# Patient Record
Sex: MALE | Race: WHITE
[De-identification: names, ages, dates, MRNs, and addresses within clinical notes are randomized per-mention and may not be internally consistent; named-entity substitution may affect disease eponyms.]

---

## 2018-02-05 ENCOUNTER — HOSPITAL ENCOUNTER (OUTPATIENT)
Dept: HOSPITAL 89 - LAB | Age: 78
End: 2018-02-05
Attending: FAMILY MEDICINE
Payer: MEDICARE

## 2018-02-05 DIAGNOSIS — E55.9: ICD-10-CM

## 2018-02-05 DIAGNOSIS — I10: Primary | ICD-10-CM

## 2018-02-05 DIAGNOSIS — E53.8: ICD-10-CM

## 2018-02-05 LAB — PLATELET COUNT, AUTOMATED: 194 K/UL (ref 150–450)

## 2018-02-05 PROCEDURE — 82435 ASSAY OF BLOOD CHLORIDE: CPT

## 2018-02-05 PROCEDURE — 82607 VITAMIN B-12: CPT

## 2018-02-05 PROCEDURE — 84075 ASSAY ALKALINE PHOSPHATASE: CPT

## 2018-02-05 PROCEDURE — 82040 ASSAY OF SERUM ALBUMIN: CPT

## 2018-02-05 PROCEDURE — 82947 ASSAY GLUCOSE BLOOD QUANT: CPT

## 2018-02-05 PROCEDURE — 85025 COMPLETE CBC W/AUTO DIFF WBC: CPT

## 2018-02-05 PROCEDURE — 82565 ASSAY OF CREATININE: CPT

## 2018-02-05 PROCEDURE — 36415 COLL VENOUS BLD VENIPUNCTURE: CPT

## 2018-02-05 PROCEDURE — 84132 ASSAY OF SERUM POTASSIUM: CPT

## 2018-02-05 PROCEDURE — 82374 ASSAY BLOOD CARBON DIOXIDE: CPT

## 2018-02-05 PROCEDURE — 84295 ASSAY OF SERUM SODIUM: CPT

## 2018-02-05 PROCEDURE — 82306 VITAMIN D 25 HYDROXY: CPT

## 2018-02-05 PROCEDURE — 82247 BILIRUBIN TOTAL: CPT

## 2018-02-05 PROCEDURE — 84450 TRANSFERASE (AST) (SGOT): CPT

## 2018-02-05 PROCEDURE — 82310 ASSAY OF CALCIUM: CPT

## 2018-02-05 PROCEDURE — 84460 ALANINE AMINO (ALT) (SGPT): CPT

## 2018-02-05 PROCEDURE — 84155 ASSAY OF PROTEIN SERUM: CPT

## 2018-02-05 PROCEDURE — 84520 ASSAY OF UREA NITROGEN: CPT

## 2018-08-23 LAB — PLATELET COUNT, AUTOMATED: 249 K/UL (ref 150–450)

## 2018-08-23 NOTE — EKG
FACILITY: [facility] 

 

PATIENT NAME: SHAYNE ANDUJAR

: [id number]

MR: [id number]

V: [id number]

EXAM DATE: 

ORDERING PHYSICIAN: JENNIFER ANDUJAR

TECHNOLOGIST: 

 

Test Reason : 

Blood Pressure : ***/*** mmHG

Vent. Rate : 074 BPM     Atrial Rate : 258 BPM

   P-R Int : 000 ms          QRS Dur : 094 ms

    QT Int : 420 ms       P-R-T Axes : 000 -47 022 degrees

   QTc Int : 466 ms

 

Atrial fibrillation

Left anterior fascicular block

Abnormal ECG

When compared with ECG of 07-OCT-2017 22:15,

No significant change was found

Confirmed by Simon Pérez ([id number]) on 2018 3:43:51 PM

 

Referred By:             Confirmed By:Simon Frank

## 2018-08-29 ENCOUNTER — HOSPITAL ENCOUNTER (INPATIENT)
Dept: HOSPITAL 89 - OR | Age: 78
LOS: 5 days | Discharge: NURSING, REHAB OR LONG-TERM CARE | DRG: 460 | End: 2018-09-03
Attending: ORTHOPAEDIC SURGERY | Admitting: ORTHOPAEDIC SURGERY
Payer: MEDICARE

## 2018-08-29 VITALS
WEIGHT: 224 LBS | BODY MASS INDEX: 35.16 KG/M2 | BODY MASS INDEX: 35.16 KG/M2 | WEIGHT: 224 LBS | HEIGHT: 67 IN | HEIGHT: 67 IN

## 2018-08-29 VITALS — DIASTOLIC BLOOD PRESSURE: 84 MMHG | SYSTOLIC BLOOD PRESSURE: 116 MMHG

## 2018-08-29 VITALS — SYSTOLIC BLOOD PRESSURE: 94 MMHG | DIASTOLIC BLOOD PRESSURE: 84 MMHG

## 2018-08-29 VITALS — DIASTOLIC BLOOD PRESSURE: 66 MMHG | SYSTOLIC BLOOD PRESSURE: 97 MMHG

## 2018-08-29 VITALS — SYSTOLIC BLOOD PRESSURE: 122 MMHG | DIASTOLIC BLOOD PRESSURE: 90 MMHG

## 2018-08-29 VITALS — SYSTOLIC BLOOD PRESSURE: 108 MMHG | DIASTOLIC BLOOD PRESSURE: 87 MMHG

## 2018-08-29 VITALS — DIASTOLIC BLOOD PRESSURE: 86 MMHG | SYSTOLIC BLOOD PRESSURE: 122 MMHG

## 2018-08-29 VITALS — DIASTOLIC BLOOD PRESSURE: 77 MMHG | SYSTOLIC BLOOD PRESSURE: 115 MMHG

## 2018-08-29 VITALS — SYSTOLIC BLOOD PRESSURE: 126 MMHG | DIASTOLIC BLOOD PRESSURE: 95 MMHG

## 2018-08-29 VITALS — DIASTOLIC BLOOD PRESSURE: 77 MMHG | SYSTOLIC BLOOD PRESSURE: 104 MMHG

## 2018-08-29 VITALS — SYSTOLIC BLOOD PRESSURE: 116 MMHG | DIASTOLIC BLOOD PRESSURE: 74 MMHG

## 2018-08-29 VITALS — DIASTOLIC BLOOD PRESSURE: 70 MMHG | SYSTOLIC BLOOD PRESSURE: 111 MMHG

## 2018-08-29 VITALS — SYSTOLIC BLOOD PRESSURE: 118 MMHG | DIASTOLIC BLOOD PRESSURE: 80 MMHG

## 2018-08-29 VITALS — DIASTOLIC BLOOD PRESSURE: 86 MMHG | SYSTOLIC BLOOD PRESSURE: 124 MMHG

## 2018-08-29 VITALS — DIASTOLIC BLOOD PRESSURE: 89 MMHG | SYSTOLIC BLOOD PRESSURE: 135 MMHG

## 2018-08-29 DIAGNOSIS — M43.16: ICD-10-CM

## 2018-08-29 DIAGNOSIS — Z90.49: ICD-10-CM

## 2018-08-29 DIAGNOSIS — Z99.81: ICD-10-CM

## 2018-08-29 DIAGNOSIS — Z98.84: ICD-10-CM

## 2018-08-29 DIAGNOSIS — Z79.01: ICD-10-CM

## 2018-08-29 DIAGNOSIS — I10: ICD-10-CM

## 2018-08-29 DIAGNOSIS — M10.9: ICD-10-CM

## 2018-08-29 DIAGNOSIS — M54.16: ICD-10-CM

## 2018-08-29 DIAGNOSIS — G47.33: ICD-10-CM

## 2018-08-29 DIAGNOSIS — K21.9: ICD-10-CM

## 2018-08-29 DIAGNOSIS — J45.909: ICD-10-CM

## 2018-08-29 DIAGNOSIS — M48.061: Primary | ICD-10-CM

## 2018-08-29 DIAGNOSIS — I48.2: ICD-10-CM

## 2018-08-29 DIAGNOSIS — G89.18: ICD-10-CM

## 2018-08-29 PROCEDURE — 0SG1071 FUSION OF 2 OR MORE LUMBAR VERTEBRAL JOINTS WITH AUTOLOGOUS TISSUE SUBSTITUTE, POSTERIOR APPROACH, POSTERIOR COLUMN, OPEN APPROACH: ICD-10-PCS | Performed by: ORTHOPAEDIC SURGERY

## 2018-08-29 PROCEDURE — 82310 ASSAY OF CALCIUM: CPT

## 2018-08-29 PROCEDURE — 72020 X-RAY EXAM OF SPINE 1 VIEW: CPT

## 2018-08-29 PROCEDURE — 82374 ASSAY BLOOD CARBON DIOXIDE: CPT

## 2018-08-29 PROCEDURE — 82565 ASSAY OF CREATININE: CPT

## 2018-08-29 PROCEDURE — 82040 ASSAY OF SERUM ALBUMIN: CPT

## 2018-08-29 PROCEDURE — 82947 ASSAY GLUCOSE BLOOD QUANT: CPT

## 2018-08-29 PROCEDURE — 36415 COLL VENOUS BLD VENIPUNCTURE: CPT

## 2018-08-29 PROCEDURE — 82247 BILIRUBIN TOTAL: CPT

## 2018-08-29 PROCEDURE — 94640 AIRWAY INHALATION TREATMENT: CPT

## 2018-08-29 PROCEDURE — 01NB0ZZ RELEASE LUMBAR NERVE, OPEN APPROACH: ICD-10-PCS | Performed by: ORTHOPAEDIC SURGERY

## 2018-08-29 PROCEDURE — 84132 ASSAY OF SERUM POTASSIUM: CPT

## 2018-08-29 PROCEDURE — 86900 BLOOD TYPING SEROLOGIC ABO: CPT

## 2018-08-29 PROCEDURE — 86850 RBC ANTIBODY SCREEN: CPT

## 2018-08-29 PROCEDURE — 84075 ASSAY ALKALINE PHOSPHATASE: CPT

## 2018-08-29 PROCEDURE — 86901 BLOOD TYPING SEROLOGIC RH(D): CPT

## 2018-08-29 PROCEDURE — 72100 X-RAY EXAM L-S SPINE 2/3 VWS: CPT

## 2018-08-29 PROCEDURE — 85018 HEMOGLOBIN: CPT

## 2018-08-29 PROCEDURE — 84520 ASSAY OF UREA NITROGEN: CPT

## 2018-08-29 PROCEDURE — 5A09357 ASSISTANCE WITH RESPIRATORY VENTILATION, LESS THAN 24 CONSECUTIVE HOURS, CONTINUOUS POSITIVE AIRWAY PRESSURE: ICD-10-PCS | Performed by: INTERNAL MEDICINE

## 2018-08-29 PROCEDURE — 84155 ASSAY OF PROTEIN SERUM: CPT

## 2018-08-29 PROCEDURE — 84460 ALANINE AMINO (ALT) (SGPT): CPT

## 2018-08-29 PROCEDURE — 84295 ASSAY OF SERUM SODIUM: CPT

## 2018-08-29 PROCEDURE — 93005 ELECTROCARDIOGRAM TRACING: CPT

## 2018-08-29 PROCEDURE — 82435 ASSAY OF BLOOD CHLORIDE: CPT

## 2018-08-29 PROCEDURE — 84450 TRANSFERASE (AST) (SGOT): CPT

## 2018-08-29 PROCEDURE — 97161 PT EVAL LOW COMPLEX 20 MIN: CPT

## 2018-08-29 PROCEDURE — 84443 ASSAY THYROID STIM HORMONE: CPT

## 2018-08-29 PROCEDURE — 85025 COMPLETE CBC W/AUTO DIFF WBC: CPT

## 2018-08-29 RX ADMIN — MONTELUKAST SODIUM SCH MG: 10 TABLET, FILM COATED ORAL at 21:04

## 2018-08-29 RX ADMIN — SODIUM CHLORIDE SCH MLS/HR: 900 IRRIGANT IRRIGATION at 22:23

## 2018-08-29 RX ADMIN — OXYCODONE HYDROCHLORIDE PRN MG: 5 TABLET ORAL at 22:23

## 2018-08-29 RX ADMIN — DOCUSATE SODIUM SCH MG: 100 CAPSULE, LIQUID FILLED ORAL at 21:04

## 2018-08-29 RX ADMIN — OXYCODONE HYDROCHLORIDE PRN MG: 5 TABLET ORAL at 13:40

## 2018-08-29 NOTE — OPERATIVE REPORT 1
EVENT DATE: August 29, 2018

SURGEON: Alek Farr MD

ANESTHESIOLOGIST: Aris Heard MD 

ANESTHESIA: General endotracheal.

ASSISTANT: JAY Odom





PREOPERATIVE DIAGNOSIS  

Lumbar spinal stenosis with lumbar third vertebra-lumbar fourth vertebra (L3-L4)
and lumbar fourth vertebra-lumbar fifth vertebra (L4-L5) spondylolisthesis.



POSTOPERATIVE DIAGNOSIS 

Lumbar spinal stenosis with lumbar third vertebra-lumbar fourth vertebra (L3-L4)
and lumbar fourth vertebra-lumbar fifth vertebra (L4-L5) spondylolisthesis.



PROCEDURE PERFORMED 

Lumbar third vertebra (L3)- lumbar fifth vertebra (L5) laminectomy with lumbar 
third vertebra (L3)-lumbar fifth vertebra (L5) posterior lateral instrumented 
fusion.



IV FLUIDS

2200 cc.



ESTIMATED BLOOD LOSS 

160 cc.



IMPLANTS USED 

6.5 mm x 50 mm Reline pedicle screws from [employer] x6, 5.5 mm x 60 mm connecting
rods from [employer] x2 and locking caps from [employer] 46.



SPECIMENS

None.



DRAINS

None.



COMPLICATIONS

None,



DISPOSITION

Post-anesthesia Care Unit.



INDICATIONS FOR SURGERY

Mr. Farr is a 78-year-old gentleman who presented to my clinic with the chief 
complaint of radiating bilateral lower extremity pain, numbness and tingling.  
He noted significant decrease in walking tolerance secondary to heaviness and 
tiredness of the legs, worse on the right than the left.  He did have some urge 
incontinence of urine but no issues with fecal incontinence.  His physical 
examination was significant for a positive straight leg raising maneuver 
bilaterally with decreased strength in the right tibialis anterior at 4/5 
compared to 5/5 on the left.  His extensor hallucis longus was 4+/5 on the left,
4/5 on the right and peroneals were 4/5 bilaterally.  Light touch sensation was 
intact in all dermatomal distributions.  Diagnostic studies showed an auto 
fusion of L2-L3 and severe spinal stenosis at L3-L4 and L4-L5 with a 
retrolisthesis of L3 and L4 and anterolisthesis of L4 and L5. 



Secondary to ongoing symptoms and failure of nonsurgical treatment including 
physical therapy, injections, medications and activity modifications, the 
patient was offered and elected to undergo L3-L5 laminectomy and L3-L5 posterior
lateral instrumented fusion.



Prior to surgery, I explained in detail to the patient the possible risks of 
surgery.  These risks include bleeding, infection, damage to surrounding 
structures, need for further surgery, persistent and/or worsening pain, nerve 
root injury, spinal fluid lead, meningitis, death, blindness, sexual 
dysfunction, autonomic nervous system dysfunction and other unforeseen medical 
and surgical complications.  An understanding that in general spinal surgery is 
more predictive at improving extremity discomfort than axial spine pain was 
stressed.





DESCRIPTION OF PROCEDURE 

On the date of surgery, the patient was met in the preoperative hold area and 
all questions were answered.  The operative site was identified and marked by 
myself.  He was brought in good condition to the operating room and after 
succumbing to anesthesia was positioned in the prone position on a Dom 
table.  All bony protuberances and soft tissues were well padded in the standard
fashion.  Care was taken to maintain appropriate perfusion pressures during 
anesthesia.  Preoperative antibiotics were administered according to the 
appropriate timing schedule.  At the conclusion of the procedure, sponge and 
needle counts were correct x2.  



A final time-out was undertaken by members of the operating team to confirm 
correct patient, correct levels and correct surgery.  A vertical midline 
incision was then made over the intended spinal levels and sharp dissection was 
carried out down to the posterior elements.  The lumbar dorsal fascia was 
incised with electrocautery device and soft tissues were elevated off the 
posterior elements in a subperiosteal manner.  A lateral radiograph was obtained
to confirm appropriate spinal levels.  Starting points for pedicle screws at L3,
L4 and L5 were cleared of soft tissues using the electrocautery device.  This 
point was at approximately the intersection of the pars intra-articularis, the 
mid point of the transverse process and the lateral border of the superior 
articular process of each respected vertebrae. We then packed the lateral 
gutters with thrombin soaked sponges and proceeded with the laminectomy.



A Leksell rongeur and Gamaliel bone cutter was used to remove the spinous 
processes of L3 and L4.  A midline decompression was then performed, first 
thinning the lamina with the Leksell rongeur and a high speed bur.  The canal 
was entered by undermining the superior insertion of the ligamentum flavum from 
the inferior surface of the L4 lamina.  A Kauai elevator was used to separate 
the dura for many adhesions to surrounding bone and soft tissue prior to use of 
the Kerrison punch. A midline decompression was performed using 3-0 and 4-0 
Kerrison rongeurs.  We then turned our attention to bilateral lateral recess 
decompressions at both the L3-L4 and the L4-L5 levels.  There was severe 
stenosis secondary to facet hypertrophy and ligamentum flavum thickening so this
took quite some time.  Ultimately, however, we were able to achieve an excellent
decompression and at the conclusion of the decompression a Kauai elevator was 
run down the lateral recesses as well as out the foramina of involved nerve 
roots to ensure complete decompression.  Hemostasis was obtained by utilizing 
FloSeal and surgical patties in the lateral recesses of the canal bilaterally. 



Attention was then turned to placement of pedicle screws.  Starting points for 
pedicle screws were identified bilaterally at L3, L4 and L5.  Again, this was at
approximately the intersection of the pars intra-articularis, the midpoint of 
the transverse process and the lateral border of the facet/superior articular 
process.  A 5 mm bur was used to decorticate the overlying bone and a Lenke type
probe was advanced against resistance through the pedicle.  A ball tip feeler 
was used to palpate the pedicle superiorly, inferiorly, medially and laterally 
as well as distally to confirm absence of bony breaching.  Appropriate length 
screws were selected; in this case, 6.5 mm x 50 mm in length. Screws were placed
in this manner bilaterally at L3, L4 and L5 using the same size screw for all 
pedicles.  Each screw was tested with neurophysiologic monitoring and this 
confirmed absence of bony breaching.  5.5 mm x 60 mm connecting rods were then 
selected.  The screw head adjustor was used to align the tulips and the 
connecting rods were then placed.  A locking cap was placed and tightened in the
L3 screws bilaterally and then we used a reduction tool to reduce the L4 
vertebrae using the pedicle screws.  Locking caps were then placed in all screws
bilaterally and a lateral radiograph was obtained that showed excellent 
positioning of the screws.  The final tightening device was then used and all 
screws were finally tightened.  The transverse process of the L3-L4 and L4-L5 
vertebrae were then decorticated with a high speed bur and local bone graft that
had been run through the bone mill was packed in the lateral gutters for a 
posterior lateral fusion.   The wound had previously been irrigated with greater
than 2 liters of copious sterile saline solution and at this point we then 
closed the incision with a running suture for the deep fascia, interrupted 
sutures for the subcutaneous tissue and a running subcuticular skin stitch.  
Sponge and needle counts were correct x2.





POSTOPERATIVE CARE PLAN

The patient will remain in the hospital until he meets discharge criteria, 
tolerating p.o. with good pain control and with clearance from physical therapy.
 He will follow up in my clinic in two weeks' time for wound check and 
examination.

NORA

## 2018-08-29 NOTE — RADIOLOGY IMAGING REPORT
FACILITY: [facility] 

 

PATIENT NAME: Randall Farr

: 1940

MR: [id number]

V: [id number]

EXAM DATE: [id number]

ORDERING PHYSICIAN: JENNIFER FARR

TECHNOLOGIST: 

 

Location: [facility]

Patient: Randall Farr

: 1940

MRN: [id number]

Visit/Account:[id number]

Date of Sevice:  2018

 

ACCESSION #: [id number]

 

EXAMINATION:  Intraoperative radiographs of the lumbar spine  2018 6:37 AM

 

HISTORY: L-3-4, L4-5 LAMINECTOMIES

 

COMPARISON:  Lumbar MR 2018

 

FLUOROSCOPY TIME: No fluoroscopy was utilized

 

FINDINGS:  Intraoperative radiographic imaging was provided without active fluoroscopy.  Imaging of t
he lumbar spine shows posterior localization with final image demonstrating L3-L5 fusion.

 

IMPRESSION:

Intraoperative imaging during lower lumbar fusion.

 

Report Dictated By: Jeremie Gastelum MD at 2018 11:13 AM

 

Report E-Signed By: Jeremie Gastelum MD  at 2018 11:22 AM

 

WSN:[id number]

## 2018-08-30 VITALS — DIASTOLIC BLOOD PRESSURE: 80 MMHG | SYSTOLIC BLOOD PRESSURE: 108 MMHG

## 2018-08-30 VITALS — DIASTOLIC BLOOD PRESSURE: 82 MMHG | SYSTOLIC BLOOD PRESSURE: 120 MMHG

## 2018-08-30 VITALS — DIASTOLIC BLOOD PRESSURE: 81 MMHG | SYSTOLIC BLOOD PRESSURE: 114 MMHG

## 2018-08-30 VITALS — SYSTOLIC BLOOD PRESSURE: 123 MMHG | DIASTOLIC BLOOD PRESSURE: 80 MMHG

## 2018-08-30 VITALS — DIASTOLIC BLOOD PRESSURE: 69 MMHG | SYSTOLIC BLOOD PRESSURE: 115 MMHG

## 2018-08-30 VITALS — SYSTOLIC BLOOD PRESSURE: 106 MMHG | DIASTOLIC BLOOD PRESSURE: 75 MMHG

## 2018-08-30 RX ADMIN — HYPROMELLOSE PRN DROP: 4 SOLUTION/ DROPS OPHTHALMIC at 11:31

## 2018-08-30 RX ADMIN — OXYCODONE HYDROCHLORIDE PRN MG: 5 TABLET ORAL at 06:03

## 2018-08-30 RX ADMIN — DOCUSATE SODIUM SCH MG: 100 CAPSULE, LIQUID FILLED ORAL at 09:18

## 2018-08-30 RX ADMIN — HYDROCODONE BITARTRATE AND ACETAMINOPHEN PRN EACH: 5; 325 TABLET ORAL at 16:08

## 2018-08-30 RX ADMIN — DOCUSATE SODIUM SCH MG: 100 CAPSULE, LIQUID FILLED ORAL at 20:38

## 2018-08-30 RX ADMIN — SODIUM CHLORIDE SCH MLS/HR: 900 IRRIGANT IRRIGATION at 06:17

## 2018-08-30 RX ADMIN — OXYCODONE HYDROCHLORIDE PRN MG: 5 TABLET ORAL at 17:13

## 2018-08-30 RX ADMIN — MONTELUKAST SODIUM SCH MG: 10 TABLET, FILM COATED ORAL at 20:38

## 2018-08-30 RX ADMIN — DIAZEPAM PRN MG: 5 TABLET ORAL at 06:03

## 2018-08-30 RX ADMIN — HYPROMELLOSE PRN DROP: 4 SOLUTION/ DROPS OPHTHALMIC at 09:19

## 2018-08-30 RX ADMIN — OXYCODONE HYDROCHLORIDE PRN MG: 5 TABLET ORAL at 20:38

## 2018-08-30 RX ADMIN — LOSARTAN POTASSIUM SCH MG: 50 TABLET, FILM COATED ORAL at 09:20

## 2018-08-30 RX ADMIN — OXYCODONE HYDROCHLORIDE PRN MG: 5 TABLET ORAL at 09:25

## 2018-08-30 RX ADMIN — DIAZEPAM PRN MG: 5 TABLET ORAL at 20:39

## 2018-08-30 RX ADMIN — HYDROMORPHONE HYDROCHLORIDE PRN MG: 2 INJECTION, SOLUTION INTRAMUSCULAR; INTRAVENOUS; SUBCUTANEOUS at 11:14

## 2018-08-30 RX ADMIN — DIAZEPAM PRN MG: 5 TABLET ORAL at 14:28

## 2018-08-30 NOTE — RADIOLOGY IMAGING REPORT
FACILITY: [facility] [place] 

 

PATIENT NAME: Randall Farr

: 1940

MR: [id number]

V: [id number]

EXAM DATE: 

ORDERING PHYSICIAN: JENNIFER FARR

TECHNOLOGIST: 

 

Location: [facility]

Patient: Randall Farr

: 1940

MRN: [id number]

Visit/Account:[id number]

Date of Sevice:  2018

 

ACCESSION #: [id number]

 

Two-view lumbar spine

 

COMPARISONS: Views of the lumbar spine dated 2018

 

ADDITIONAL PERTINENT HISTORY: Postop

 

FINDINGS:

 

Postoperative changes: Patient status post previous posterior interbody fusion of L3-L5.  Bilateral l
aminectomies at L3-L4 and L4-L5.

 

Vertebral body heights and alignments: Mild retrolisthesis of L3 on L4, L2 on L3 and L1 on L2.  Mild 
retrolisthesis of L5 on S1.  Mild scoliotic curvature convex to the left centered at L2-L3.

 

Vertebral bodies: Endplate sclerosis and osteophyte formation and facet hypertrophic changes at multi
ple levels within the lumbar spine.  No bony fractures noted.

 

Disc spaces: Disc space narrowing at multiple levels within the lumbar spine.

 

Visualized bony pelvis: Negative.

 

Surrounding soft tissues: Minimal atherosclerotic disease of the abdominal aorta.

 

IMPRESSION:

1.  Scoliotic, spondylitic and postoperative changes as detailed above.

2.  No acute appearing bony abnormalities.

 

Report Dictated By: Seamus Kirkland MD at 2018 10:57 AM

 

Report E-Signed By: Seamus Kirkland MD  at 2018 11:00 AM

 

WSN:[id number]

## 2018-08-31 VITALS — DIASTOLIC BLOOD PRESSURE: 84 MMHG | SYSTOLIC BLOOD PRESSURE: 116 MMHG

## 2018-08-31 VITALS — SYSTOLIC BLOOD PRESSURE: 98 MMHG | DIASTOLIC BLOOD PRESSURE: 83 MMHG

## 2018-08-31 VITALS — SYSTOLIC BLOOD PRESSURE: 111 MMHG | DIASTOLIC BLOOD PRESSURE: 92 MMHG

## 2018-08-31 VITALS — DIASTOLIC BLOOD PRESSURE: 78 MMHG | SYSTOLIC BLOOD PRESSURE: 102 MMHG

## 2018-08-31 RX ADMIN — OXYCODONE HYDROCHLORIDE PRN MG: 5 TABLET ORAL at 08:41

## 2018-08-31 RX ADMIN — DIAZEPAM PRN MG: 5 TABLET ORAL at 19:28

## 2018-08-31 RX ADMIN — DOCUSATE SODIUM SCH MG: 100 CAPSULE, LIQUID FILLED ORAL at 21:40

## 2018-08-31 RX ADMIN — DIAZEPAM PRN MG: 5 TABLET ORAL at 05:27

## 2018-08-31 RX ADMIN — DOCUSATE SODIUM SCH MG: 100 CAPSULE, LIQUID FILLED ORAL at 08:40

## 2018-08-31 RX ADMIN — RIVAROXABAN SCH MG: 10 TABLET, FILM COATED ORAL at 11:22

## 2018-08-31 RX ADMIN — LOSARTAN POTASSIUM SCH MG: 50 TABLET, FILM COATED ORAL at 09:00

## 2018-08-31 RX ADMIN — OXYCODONE HYDROCHLORIDE PRN MG: 5 TABLET ORAL at 16:06

## 2018-08-31 RX ADMIN — HYDROMORPHONE HYDROCHLORIDE PRN MG: 2 INJECTION, SOLUTION INTRAMUSCULAR; INTRAVENOUS; SUBCUTANEOUS at 09:15

## 2018-08-31 RX ADMIN — OXYCODONE HYDROCHLORIDE PRN MG: 5 TABLET ORAL at 00:16

## 2018-08-31 RX ADMIN — OXYCODONE HYDROCHLORIDE PRN MG: 5 TABLET ORAL at 19:27

## 2018-08-31 RX ADMIN — OXYCODONE HYDROCHLORIDE PRN MG: 5 TABLET ORAL at 12:23

## 2018-08-31 RX ADMIN — DIAZEPAM PRN MG: 5 TABLET ORAL at 12:24

## 2018-08-31 RX ADMIN — OXYCODONE HYDROCHLORIDE PRN MG: 5 TABLET ORAL at 05:27

## 2018-08-31 RX ADMIN — OXYCODONE HYDROCHLORIDE PRN MG: 5 TABLET ORAL at 23:56

## 2018-08-31 RX ADMIN — MONTELUKAST SODIUM SCH MG: 10 TABLET, FILM COATED ORAL at 21:40

## 2018-09-01 VITALS — DIASTOLIC BLOOD PRESSURE: 85 MMHG | SYSTOLIC BLOOD PRESSURE: 113 MMHG

## 2018-09-01 VITALS — DIASTOLIC BLOOD PRESSURE: 71 MMHG | SYSTOLIC BLOOD PRESSURE: 98 MMHG

## 2018-09-01 VITALS — DIASTOLIC BLOOD PRESSURE: 83 MMHG | SYSTOLIC BLOOD PRESSURE: 113 MMHG

## 2018-09-01 VITALS — SYSTOLIC BLOOD PRESSURE: 130 MMHG | DIASTOLIC BLOOD PRESSURE: 86 MMHG

## 2018-09-01 VITALS — SYSTOLIC BLOOD PRESSURE: 129 MMHG | DIASTOLIC BLOOD PRESSURE: 84 MMHG

## 2018-09-01 RX ADMIN — DIAZEPAM PRN MG: 5 TABLET ORAL at 16:14

## 2018-09-01 RX ADMIN — MONTELUKAST SODIUM SCH MG: 10 TABLET, FILM COATED ORAL at 21:23

## 2018-09-01 RX ADMIN — DOCUSATE SODIUM SCH MG: 100 CAPSULE, LIQUID FILLED ORAL at 20:40

## 2018-09-01 RX ADMIN — RIVAROXABAN SCH MG: 10 TABLET, FILM COATED ORAL at 09:00

## 2018-09-01 RX ADMIN — HYDROCODONE BITARTRATE AND ACETAMINOPHEN PRN EACH: 5; 325 TABLET ORAL at 12:27

## 2018-09-01 RX ADMIN — LOSARTAN POTASSIUM SCH MG: 50 TABLET, FILM COATED ORAL at 09:00

## 2018-09-01 RX ADMIN — HYDROCODONE BITARTRATE AND ACETAMINOPHEN PRN EACH: 5; 325 TABLET ORAL at 23:50

## 2018-09-01 RX ADMIN — DOCUSATE SODIUM SCH MG: 100 CAPSULE, LIQUID FILLED ORAL at 09:00

## 2018-09-01 RX ADMIN — DIAZEPAM PRN MG: 5 TABLET ORAL at 09:37

## 2018-09-01 RX ADMIN — ALBUTEROL SULFATE PRN PUFF: 90 AEROSOL, METERED RESPIRATORY (INHALATION) at 16:57

## 2018-09-01 RX ADMIN — HYDROMORPHONE HYDROCHLORIDE PRN MG: 2 INJECTION, SOLUTION INTRAMUSCULAR; INTRAVENOUS; SUBCUTANEOUS at 07:29

## 2018-09-01 RX ADMIN — OXYCODONE HYDROCHLORIDE PRN MG: 5 TABLET ORAL at 09:36

## 2018-09-01 RX ADMIN — HYDROMORPHONE HYDROCHLORIDE PRN MG: 2 INJECTION, SOLUTION INTRAMUSCULAR; INTRAVENOUS; SUBCUTANEOUS at 18:18

## 2018-09-02 VITALS — DIASTOLIC BLOOD PRESSURE: 75 MMHG | SYSTOLIC BLOOD PRESSURE: 108 MMHG

## 2018-09-02 VITALS — SYSTOLIC BLOOD PRESSURE: 120 MMHG | DIASTOLIC BLOOD PRESSURE: 74 MMHG

## 2018-09-02 VITALS — DIASTOLIC BLOOD PRESSURE: 59 MMHG | SYSTOLIC BLOOD PRESSURE: 126 MMHG

## 2018-09-02 VITALS — DIASTOLIC BLOOD PRESSURE: 78 MMHG | SYSTOLIC BLOOD PRESSURE: 112 MMHG

## 2018-09-02 VITALS — DIASTOLIC BLOOD PRESSURE: 73 MMHG | SYSTOLIC BLOOD PRESSURE: 107 MMHG

## 2018-09-02 LAB — PLATELET COUNT, AUTOMATED: 185 K/UL (ref 150–450)

## 2018-09-02 RX ADMIN — OXYCODONE HYDROCHLORIDE PRN MG: 5 TABLET ORAL at 08:30

## 2018-09-02 RX ADMIN — LOSARTAN POTASSIUM SCH MG: 50 TABLET, FILM COATED ORAL at 08:37

## 2018-09-02 RX ADMIN — OXYCODONE HYDROCHLORIDE PRN MG: 5 TABLET ORAL at 15:35

## 2018-09-02 RX ADMIN — POLYETHYLENE GLYCOL 3350 SCH GM: 17 POWDER, FOR SOLUTION ORAL at 12:05

## 2018-09-02 RX ADMIN — DOCUSATE SODIUM SCH MG: 100 CAPSULE, LIQUID FILLED ORAL at 22:06

## 2018-09-02 RX ADMIN — DIAZEPAM PRN MG: 5 TABLET ORAL at 08:29

## 2018-09-02 RX ADMIN — MONTELUKAST SODIUM SCH MG: 10 TABLET, FILM COATED ORAL at 22:06

## 2018-09-02 RX ADMIN — HYDROCODONE BITARTRATE AND ACETAMINOPHEN PRN EACH: 5; 325 TABLET ORAL at 12:05

## 2018-09-02 RX ADMIN — DOCUSATE SODIUM SCH MG: 100 CAPSULE, LIQUID FILLED ORAL at 08:31

## 2018-09-02 RX ADMIN — RIVAROXABAN SCH MG: 10 TABLET, FILM COATED ORAL at 08:31

## 2018-09-02 NOTE — RADIOLOGY IMAGING REPORT
FACILITY: [facility] [place] 

 

PATIENT NAME: Randall Farr

: 1940

MR: [id number]

V: [id number]

EXAM DATE: 

ORDERING PHYSICIAN: CHRISTINA RIVAS

TECHNOLOGIST: 

 

Location: [facility]

Patient: Randall Farr

: 1940

MRN: [id number]

Visit/Account:[id number]

Date of Sevice:  2018

 

ACCESSION #: [id number]

 

LUMBAR SPINE 2 OR 3 VIEW

 

INDICATION: Postop laminectomy.

 

COMPARISON: May 30, 2018.

 

FINDINGS:   AP and lateral view of the lumbar spine. There are 5 nonrib-bearing lumbar vertebral bodi
es. Postsurgical changes from L3 through L5 with by pedicular screws and posterior rods in bony fusio
n changes. The alignment is maintained and unchanged from previous examination. No periprosthetic mauro
encies. No acute abnormality seen in the postsurgical region. Lumbar spine does show a continued left
jacinto convexity. No compression fractures, bony lesions or spondylolysis. There is diffuse degenerativ
e changes with disposition, endplate changes, ostitis and facet arthropathy. The endplates are mainta
ined. The pedicles are well seen. Soft tissues unremarkable. Surgical clips in the right abdomen is u
nchanged.

 

IMPRESSION:

1. Postsurgical changes from L3 through L5 without sequelae.

2. Degenerative changes without acute abnormality.

 

Report Dictated By: Neeraj Chapman at 2018 12:08 PM

 

Report E-Signed By: Neeraj Chapman  at 2018 12:11 PM

 

WSN:[id number]

## 2018-09-03 ENCOUNTER — HOSPITAL ENCOUNTER (INPATIENT)
Dept: HOSPITAL 89 - ECF | Age: 78
LOS: 9 days | Discharge: HOME HEALTH | DRG: 950 | End: 2018-09-12
Attending: ORTHOPAEDIC SURGERY | Admitting: ORTHOPAEDIC SURGERY
Payer: MEDICARE

## 2018-09-03 VITALS
HEIGHT: 67 IN | HEIGHT: 67 IN | BODY MASS INDEX: 35.31 KG/M2 | WEIGHT: 225 LBS | WEIGHT: 225 LBS | BODY MASS INDEX: 35.31 KG/M2

## 2018-09-03 VITALS — DIASTOLIC BLOOD PRESSURE: 79 MMHG | SYSTOLIC BLOOD PRESSURE: 122 MMHG

## 2018-09-03 VITALS — SYSTOLIC BLOOD PRESSURE: 132 MMHG | DIASTOLIC BLOOD PRESSURE: 88 MMHG

## 2018-09-03 VITALS — DIASTOLIC BLOOD PRESSURE: 73 MMHG | SYSTOLIC BLOOD PRESSURE: 122 MMHG

## 2018-09-03 DIAGNOSIS — Z99.81: ICD-10-CM

## 2018-09-03 DIAGNOSIS — G47.33: ICD-10-CM

## 2018-09-03 DIAGNOSIS — Z79.01: ICD-10-CM

## 2018-09-03 DIAGNOSIS — I48.2: ICD-10-CM

## 2018-09-03 DIAGNOSIS — M62.830: ICD-10-CM

## 2018-09-03 DIAGNOSIS — Z48.811: Primary | ICD-10-CM

## 2018-09-03 DIAGNOSIS — J45.909: ICD-10-CM

## 2018-09-03 DIAGNOSIS — I10: ICD-10-CM

## 2018-09-03 DIAGNOSIS — Z90.49: ICD-10-CM

## 2018-09-03 PROCEDURE — 82310 ASSAY OF CALCIUM: CPT

## 2018-09-03 PROCEDURE — 36415 COLL VENOUS BLD VENIPUNCTURE: CPT

## 2018-09-03 PROCEDURE — 82374 ASSAY BLOOD CARBON DIOXIDE: CPT

## 2018-09-03 PROCEDURE — 82435 ASSAY OF BLOOD CHLORIDE: CPT

## 2018-09-03 PROCEDURE — 97161 PT EVAL LOW COMPLEX 20 MIN: CPT

## 2018-09-03 PROCEDURE — 82947 ASSAY GLUCOSE BLOOD QUANT: CPT

## 2018-09-03 PROCEDURE — 5A09357 ASSISTANCE WITH RESPIRATORY VENTILATION, LESS THAN 24 CONSECUTIVE HOURS, CONTINUOUS POSITIVE AIRWAY PRESSURE: ICD-10-PCS | Performed by: ORTHOPAEDIC SURGERY

## 2018-09-03 PROCEDURE — 82565 ASSAY OF CREATININE: CPT

## 2018-09-03 PROCEDURE — 84132 ASSAY OF SERUM POTASSIUM: CPT

## 2018-09-03 PROCEDURE — 84295 ASSAY OF SERUM SODIUM: CPT

## 2018-09-03 PROCEDURE — 84520 ASSAY OF UREA NITROGEN: CPT

## 2018-09-03 PROCEDURE — 97166 OT EVAL MOD COMPLEX 45 MIN: CPT

## 2018-09-03 PROCEDURE — 85025 COMPLETE CBC W/AUTO DIFF WBC: CPT

## 2018-09-03 RX ADMIN — Medication SCH MG: at 20:28

## 2018-09-03 RX ADMIN — HYPROMELLOSE PRN DROP: 4 SOLUTION/ DROPS OPHTHALMIC at 08:53

## 2018-09-03 RX ADMIN — RIVAROXABAN SCH MG: 10 TABLET, FILM COATED ORAL at 08:51

## 2018-09-03 RX ADMIN — HYDROCODONE BITARTRATE AND ACETAMINOPHEN PRN EACH: 5; 325 TABLET ORAL at 20:31

## 2018-09-03 RX ADMIN — ALBUTEROL SULFATE PRN PUFF: 90 AEROSOL, METERED RESPIRATORY (INHALATION) at 08:53

## 2018-09-03 RX ADMIN — POLYETHYLENE GLYCOL 3350 SCH GM: 17 POWDER, FOR SOLUTION ORAL at 08:51

## 2018-09-03 RX ADMIN — HYDROCODONE BITARTRATE AND ACETAMINOPHEN PRN EACH: 5; 325 TABLET ORAL at 08:51

## 2018-09-03 RX ADMIN — MONTELUKAST SODIUM SCH MG: 10 TABLET, FILM COATED ORAL at 20:28

## 2018-09-03 RX ADMIN — DOCUSATE SODIUM SCH MG: 100 CAPSULE, LIQUID FILLED ORAL at 20:28

## 2018-09-03 RX ADMIN — DOCUSATE SODIUM SCH MG: 100 CAPSULE, LIQUID FILLED ORAL at 08:50

## 2018-09-03 NOTE — OT ECF NOTE
Type of Note: Initial Note

Primary Medical Diagnosis: Generalized weakness s/p L3-L5 laminectomy with L3-L5

PLIF. **Lumbar precautions, LSO donned as tolerated with ambulation**

Occupational Therapy Evaluation Date: 9/3/18



SUBJECTIVE: 

   Prior Hospitalization: [facility] 18 thru 9/3/18. DOS: 18 with Dr. Farr 

   Prior Level of Function: Modified Independent with 4WW. Assist from spouse 

for IADLs. Spouse reports pt ambulated short household distances prior to sx.

   Prior Living Status: 

Single level house

Spouse

Assist by family

  

   Community Services: No known needs



   Home Accessibility: 

Stairs with rails

All needs on one level

Tub/shower combination



   Equipment Owned: 

Front wheeled walker

Rollator

Extended tub bench



   Medical Complications/Past Medical History: Please refer to EMR

   Psychosocial Support: Supportive spouse 

   Pain Scale (0-10): 10.5/10. Emotional support, positioning, and ice offered.



       

OBJECTIVE:

   Strength: 

      MMT:         Right   Left

      Shoulder Flexion      WFL                 WFL      

      Elbow Flexion         WFL                 WFL      

               Wrist Extension         WFL                 WFL      

               WFL                 WFL      

         (5= normal, 4= good, 3= fair, 2= poor, 1= trace)



   ROM: Both upper extremities, WFL

   

   Sensation: Intact, no concerns

   Functional Transfer: 

      Assistive Device: 

Front wheeled walker-Mod-Max Ax2 sit<>stands. Pt unable to tolerate upright 

position or ambulation

EZ/Patient assisted lift-Max Ax1

Gait belt



   ADL:

      Upper body dressing:  

      Assistive device:    

      Upper body dressing ability: N/T



      Lower body dressing:

      Assistive device: None-Will benefit from LB AE education 

      Lower body dressing ability: Max-Total Ax1 with use of sit<>stand



      Toileting:

      Assistive device:  Raised toilet seat, Grab rails

      Toileting ability:  Maximum assistance





      Grooming/hygiene: 

      Assistive device: 

      Grooming ability: N/T



      Bathing:

      Assistive device: 

      Bathing ability: N/T



Standardized Assessment:

   Barthel Index of Activities of Daily Livin/20 upon initial evaluation 

(9/3/18). 



ASSESSMENT:  "Zack" presents to [facility] requiring 2 person assist for sit<>stands 

with RW and Max Ax1 for mobility with EZ lift. At [facility], he was ambulating Mod 

(I) household distances and (I) with ADLs. He will benefit from skilled OT 

service to improve activity tolerance and optimize (I) with ADLs. 



   Problem List/Current Limitations:   

Pain

Decreased activity tolerance 

Decreased strength

Decreased ROM

Generalized weakness

Poor safety awareness

Decreased initiation



   Short Term Goals: 

1) Pt will be Mod (I) toilet task. 

2) Pt will be Mod (I) UB/LB dressing. 

3) Pt will be Mod (I) grooming/hygiene. 

4) Pt will be Min A shower task. 

5) Pt Barthel Index of ADLs score will improve by 2 points.



   Long Term Goals: Return home with assist from spouse for IADLs and possible 

 service s

   Patient Goals: Return home

   Rehabilitation Prognosis: Fair

   Barriers to Discharge: Pain, Motivation, Decreased mobility at PLOF



      

PLAN: The patient will benefit from skilled occupational therapy services 5 

times per week for 2 weeks including: 

Ther ex

ADL training

Safety training

Ther act

IADL training

Transfer training

Adaptive equip training

Bed mobility

Energy conservation

   



Thank you for this referral.  If you have any questions, concerns, or comments 

about this report or plan, please contact me at [phone].



Melia Weeks MS, OTR/L

Occupational Therapist 

NORA

## 2018-09-03 NOTE — HOSPITALIST PROGRESS NOTE
Subjective


Progress Notes


Subjective


78M admitted for lumbar stenosis, JOEY overnight. Sleepy this am after pain Rx.





Patient Complains of:


Neurological:  No: Confusion, Weakness


Gastrointestinal:  No Nausea, No Vomiting


Musculoskeletal:  Pain





Physical Exam





Vital Signs








  Date Time  Temp Pulse Resp B/P (MAP) Pulse Ox O2 Delivery O2 Flow Rate FiO2


 


9/2/18 07:01 98.0 72 18 112/78 (89) 96 Bi-PAP 1.0 














Intake and Output 


 


 9/2/18





 06:59


 


Intake Total 175 ml


 


Output Total 280 ml


 


Balance -105 ml


 


 


 


Intake Oral 175 ml


 


Output Urine Total 280 ml


 


# Voids 5








General Appearance:  No Acute Distress


Neuro:  No Gross deficits


Eyes:  PERRLA


ENT:  Normal (on simple mask.)


Neck:  No Masses


Cardiovascular:  Normal Rhythm & Peripheral Pulses


Respiratory:  Clear to Auscultation


GI:  Soft and Non-Tender


Musculoskeletal:  Other (back/hip pain)


Extremities:  Soft and Non Tender, Warm, Pulses; No Edema


Integumentary:  Skin Intact without Lesion / Mass


Result Diagram:  


9/2/18 0525 9/2/18 0525








Assessment and Plan


Problems:  


(1) Atrial fibrillation


Status:  Chronic


Assessment & Plan:  He has been rate controlled without medication. He is now 


back on his usual Xarelto 20mg daily for CVA prophylaxis. 





(2) Spinal stenosis


Assessment & Plan:  Post laminectomy and fusion. Reportedly progressing slow. 


Reports significant pain and is very lethargic due to pain medications. Per 


primary.





(3) HTN (hypertension)


Status:  Chronic


Assessment & Plan:  Will monitor BPs and resume losartan when needed.





(4) MARIAN (obstructive sleep apnea)


Status:  Chronic


Assessment & Plan:  Continue CPAP.





(5) Intrinsic asthma


Status:  Chronic


Assessment & Plan:  Continue Singulair and albuterol. He has also been using 


guaifenesin.








Exam


Sepsis Risk:  No Definite Risk











MONTALVO STANISLAV FALK DO        Sep 2, 2018 10:37
Subjective


Progress Notes


Subjective


He has been very slow to mobilize. He continues to have surgical site pain.





Physical Exam





Vital Signs








  Date Time  Temp Pulse Resp B/P (MAP) Pulse Ox O2 Delivery O2 Flow Rate FiO2


 


9/2/18 23:03 99.1 95 20 120/74 (89) 90 Oxy Mask 1.0 














Intake and Output 


 


 9/3/18





 06:59


 


Intake Total 300 ml


 


Output Total 400 ml


 


Balance -100 ml


 


 


 


Intake Oral 300 ml


 


Output Urine Total 400 ml


 


# Voids 4


 


# Bowel Movements 1








General Appearance:  Alert, Awake


Cardiovascular:  Regular Rate and Rhythm


Respiratory:  Clear to Auscultation


Extremities:  Warm, Perfused


Result Diagram:  


9/3/18 0724                                                                     


          9/2/18 0525








Assessment and Plan


Problems:  


(1) Atrial fibrillation


Status:  Chronic


Assessment & Plan:  He has been rate controlled without medication. He is now 


back on his usual Xarelto 20mg daily for CVA prophylaxis. 





(2) Spinal stenosis


Assessment & Plan:  Post laminectomy and fusion. Progressing very slowly. 


Reports significant pain and is somewhat lethargic due to pain medications. Will


try to achieve reasonable pain control with Lortab/low dose diazepam.





(3) HTN (hypertension)


Status:  Chronic


Assessment & Plan:  His BPs have been in normal range without medications. Will 


monitor BPs and resume losartan only when needed.





(4) MARIAN (obstructive sleep apnea)


Status:  Chronic


Assessment & Plan:  Continue CPAP.





(5) Intrinsic asthma


Status:  Chronic


Assessment & Plan:  Continue Singulair and albuterol. He has also been using 


guaifenesin.








Exam


Sepsis Risk:  No Definite Risk











OTF ROBERT MD             Sep 3, 2018 07:49
Subjective


Progress Notes


Subjective


He has complaints of back pain. He had no acute events overnight.





Patient Complains of:


Cardiovascular:  No: Chest Pain


Respiratory:  No: Shortness of Breath





Physical Exam





Vital Signs








  Date Time  Temp Pulse Resp B/P (MAP) Pulse Ox O2 Delivery O2 Flow Rate FiO2


 


8/30/18 11:24 99.5 90 12 106/75 (85) 91 Nasal Cannula 2.0 














Intake and Output 


 


 8/30/18





 06:59


 


Intake Total 3648 ml


 


Output Total 900 ml


 


Balance 2748 ml


 


 


 


Intake Oral 1820 ml


 


IV Total 1828 ml


 


Output Urine Total 900 ml


 


# Voids 2








General Appearance:  Alert, Awake, No Acute Distress, Afebrile


Neuro:  No Gross deficits


Cardiovascular:  Regular Rate and Rhythm


Respiratory:  No Respiratory Distress, Clear to Auscultation


Psych:  Alert & Oriented X3, Appropriate Mood & Affect





Assessment and Plan


Problems:  


(1) Atrial fibrillation


Status:  Chronic


Assessment & Plan:  He appears to be rate controlled without medication. He has 


been managed with Xarelto 20mg daily for CVA prophylaxis. Will hold the Xarelto 


for now and resume when Dr. Farr is agreeable.





(2) HTN (hypertension)


Status:  Chronic


Assessment & Plan:  Will monitor BPs and resume losartan when needed.





(3) MARIAN (obstructive sleep apnea)


Status:  Chronic


Assessment & Plan:  Continue CPAP.





(4) Intrinsic asthma


Status:  Chronic


Assessment & Plan:  Continue Singulair and albuterol. He has also been using 


guaifenesin.








Exam


Sepsis Risk:  No Definite Risk











TEENA BISWAS            Aug 30, 2018 13:10
Subjective


Progress Notes


Subjective


He has complaints of pain and decreased mobility. He had no acute events 


overnight.





Patient Complains of:


Cardiovascular:  No: Chest Pain


Respiratory:  No: Shortness of Breath





Physical Exam





Vital Signs








  Date Time  Temp Pulse Resp B/P (MAP) Pulse Ox O2 Delivery O2 Flow Rate FiO2


 


8/31/18 08:10     92 Nasal Cannula 1.0 


 


8/31/18 07:37 99.0 86 20 102/78 (86)    














Intake and Output 


 


 8/31/18





 06:59


 


Intake Total 862 ml


 


Output Total 500 ml


 


Balance 362 ml


 


 


 


Intake Oral 812 ml


 


IV Total 50 ml


 


Output Urine Total 500 ml


 


# Voids 1








General Appearance:  Alert, Awake, No Acute Distress, Afebrile


Neuro:  No Gross deficits


Cardiovascular:  Regular Rate and Rhythm


Respiratory:  No Respiratory Distress, Clear to Auscultation


Psych:  Alert & Oriented X3, Appropriate Mood & Affect





Assessment and Plan


Problems:  


(1) Atrial fibrillation


Status:  Chronic


Assessment & Plan:  He appears to be rate controlled without medication. He has 


been managed with Xarelto 20mg daily for CVA prophylaxis. His Xarelto will be 


restarted today 8/31.





(2) HTN (hypertension)


Status:  Chronic


Assessment & Plan:  Will monitor BPs and resume losartan when needed.





(3) MARIAN (obstructive sleep apnea)


Status:  Chronic


Assessment & Plan:  Continue CPAP.





(4) Intrinsic asthma


Status:  Chronic


Assessment & Plan:  Continue Singulair and albuterol. He has also been using 


guaifenesin.








Exam


Sepsis Risk:  No Definite Risk











TEENA BISWAS [facility]            Aug 31, 2018 11:36
Subjective


Progress Notes


Subjective


He is still having significant surgical site pain and some spasms.





Physical Exam





Vital Signs








  Date Time  Temp Pulse Resp B/P (MAP) Pulse Ox O2 Delivery O2 Flow Rate FiO2


 


8/31/18 21:41     92 Nasal Cannula 1.0 


 


8/31/18 21:41 99.1 85 16 98/83 (88)    














Intake and Output 


 


 9/1/18





 06:59


 


Intake Total 1340 ml


 


Output Total 450 ml


 


Balance 890 ml


 


 


 


Intake Oral 1340 ml


 


Output Urine Total 450 ml


 


# Voids 2








General Appearance:  Alert, Awake


Cardiovascular:  Other (Irregular distant tones)


Respiratory:  Clear to Auscultation (anteriorly)





Assessment and Plan


Problems:  


(1) Atrial fibrillation


Status:  Chronic


Assessment & Plan:  He has been rate controlled without medication. He is now 


back on his usual Xarelto 20mg daily for CVA prophylaxis. 





(2) HTN (hypertension)


Status:  Chronic


Assessment & Plan:  Will monitor BPs and resume losartan when needed.





(3) MARIAN (obstructive sleep apnea)


Status:  Chronic


Assessment & Plan:  Continue CPAP.





(4) Intrinsic asthma


Status:  Chronic


Assessment & Plan:  Continue Singulair and albuterol. He has also been using 


guaifenesin.








Exam


Sepsis Risk:  No Definite Risk











OTF ROBERT MD             Sep 1, 2018 06:35
Subjective


Progress Notes


Subjective


Patient seen post-op. Reviewed PMHx and medications. At present he reports doing

well. No CP/SOB/N/V.





Physical Exam





Vital Signs








  Date Time  Temp Pulse Resp B/P (MAP) Pulse Ox O2 Delivery O2 Flow Rate FiO2


 


8/29/18 12:44 97.9 63 12 116/84 (95) 94 Nasal Cannula 2.0 








General Appearance:  Alert, Awake


Cardiovascular:  Other (Irregular with distant tones)


Respiratory:  Clear to Auscultation


GI:  Soft and Non-Tender


Extremities:  Warm, Perfused


Psych:  Alert & Oriented X3





Assessment and Plan


Problems:  


(1) Atrial fibrillation


Status:  Chronic


Assessment & Plan:  He appears to be rate controlled without medication. He has 

been managed with Xarelto 20mg daily for CVA prophylaxis. Will hold the Xarelto 

for now and resume when Dr. Farr is agreeable.





(2) HTN (hypertension)


Status:  Chronic


Assessment & Plan:  Will monitor BPs and resume losartan when needed.





(3) MARIAN (obstructive sleep apnea)


Status:  Chronic


Assessment & Plan:  Continue CPAP.





(4) Intrinsic asthma


Status:  Chronic


Assessment & Plan:  Continue Singulair and albuterol. He has also been using 

guaifenesin.














OTF ROBERT MD            Aug 29, 2018 13:47
normal...

## 2018-09-03 NOTE — CONSULTANT PHARMACY REVIEW
Pharmacy Consultant Review


Medication Review


Do All Mecications have a Diag:  Yes





Beers Criteria Medication 2015


Benzodiazapines (long acting):  Diazepam (PRN FOR MUSCLE SPASMS)





Disease-Drug Interactions


History of Falls/Fractures:  Benzodiazepines (PRN FOR SPASMS), Opioids (POST OP)





Other General Cautions


Patient is 78 years old and is a candidate for BEERs evaluation


   Diazepam - ordered prn for spasms post surgery


   lortab - ordered prn post surgery


      *both present an increased risk for falls due to CNS depression

















Lexicomp Interaction Analysis











A = No known interaction C = Monitor therapy X = Avoid combination


 


B = No action needed D = Consider therapy modification 








Drugs in this analysis: Acetaminophen; Albuterol; Ascorbic Acid; Beta-Carotene; 


Bisacodyl; Cepacol (CAN); Cetirizine (Systemic); Cholecalciferol; 


Cyanocobalamin; DiazePAM; Docusate Sodium (SYN); Fluticasone (Nasal); 


Glucosamine; GuaiFENesin; Lortab; Maalox [OTC]; MiraLax [OTC]; Montelukast; 


Multivitamins/Minerals (with AE, No Iron); Simethicone; Vitamin E (Systemic); 


Xarelto 


* Drug-Drug Interactions 








D Bisacodyl  Maalox [OTC] (Antacids) 


 


D Cetirizine (Systemic) (CNS Depressants)  Lortab (HYDROcodone) 


 


D DiazePAM (CNS Depressants)  Lortab (HYDROcodone) 


 


C Cetirizine (Systemic) (CNS Depressants)  DiazePAM (CNS Depressants) 


 


C Cholecalciferol (Vitamin D Analogs)  Maalox [OTC] (Calcium Salts) 


 


C Vitamin E (Systemic)  Xarelto (Anticoagulants) 


 


B Acetaminophen  Lortab (Opioid Analgesics) 


 


B Albuterol (Beta2-Agonists)  Fluticasone (Nasal) (Corticosteroids) 


 


B DiazePAM  Maalox [OTC] (Antacids) 











Pneumococcal Vaccine


HX Pneumo Vac  ([id number]):  Yes (2-)


HX Pneumo Vac (Pneumovax):  Yes (10-)





Comments Regarding the Review


No concerns at this time aside from keeping the use of diazepam and lortab at a 


minimum (as possible) due to his age and risk for falls/CNS depression





MD Notified?


MD Notified?:  No











ONEAL LOPEZ                   Sep 3, 2018 18:31

## 2018-09-03 NOTE — PT ECF NOTE
Type of Note: Initial Note

Primary Medical Diagnosis: S/P L3-L5 laminectomy with L-L5 posterior lateral 

instrumented fusion (8/29/18)

Physical Therapy Evaluation Date: 9/3/18



SUBJECTIVE: 

   Prior Hospitalization: [facility] 8/29/18-9/3/18

   Prior Level of Function: Drew with use of RW (pt's spouse reports that he was

ambulating short household distances at [facility])

   Prior Living Status:  Single level house, Spouse

   Community Services:   Support adequate

   Home Accessibility:   3 stairs with rails, All needs on one level

   Equipment Owned:  Front wheeled walker, Rollator

      

   Medical Complications/Past Medical History: See EMR for details 

   Psychosocial Support: supportive wife 

   Pain Scale (0-10): 5-6/10 

          

OBJECTIVE:

   Strength: 

      Right Lower Extremity:

         DF: <3/5

         Knee flexion: 4/5

         Knee extension:3+/5

         Hip flexion: <3/5





      Left Lower Extremity:

         DF: 4/5

         Knee flexion: 4/5

         Knee extension: 4/5

         Hip flexion: <3/5

   ROM: Lacking full AROM of R) DF    

   Sensation: WNL

   Other Neuro findings: None noted 

   

   Bed Mobility: Not tested, pt up in chair when PT arrived for evaluation 

   Transfers:  Max A x2 for STS with RW, MaxA x1 STS with EZ lift      

   Gait:  Pt unable     

   Stairs: Not attempted



   Timed Up and Go (>12 seconds indicated increased risk for falls): Pt unable 

   10 meter walk test (0.6m/second cannot function independently):  Pt unable

   Other Objective Measures:  Pt unable



ASSESSMENT:  PT provided step by step verbal and tactile cues for scooting to 

edge of chair as well as foot and hand placement prior to initiation of STS 

transfer. Pt requires increased time and encouragement in preparation for 

transfers. PT/OT provided mod-maxA x 2 for STS with RW. Pt reports glut 

spasm and inability to stand, therefore sat back into nicho chair. A second STS 

with RW was completed, but pt was unable to successfully initiate ambulation 

upon standing. EZ lift was utilized with maxAx1 in order to stand. PT encouraged

pt to fully  Ez lift, he reports inability d/t glut pain. Pt currently 

reports lack of confidence with all mobility. He will benefit from skilled PT 

intervention in order to increase tolerance to all functional mobility and 

decrease need of assistance from others prior to d/c home. 

   

Problem List/Current Limitations: 

Pain

Decreased activity garret

Decreased strength

Decreased ROM

Decreased balance

Decreased problem solving

Decreased initiation



   Short Term Goals:  

   1: Pt to complete STS transfers with Drew and least restrictive AD 

   2: Pt to ambulate 50' with Drew and least restrictive AD 

   3: Pt to complete bed mobility with log roll technique and Drew 

   4: Pt to demonstrate ability to ayan/doff LSO with Emily from spouse. 



   Long Term Goals: Pt to d/c home with decreased need of assistance from others



   Patient Goals: Discharge home with spouse 

   Rehabilitation Prognosis:   Fair



   Barriers for Discharge: High levels of pain, increased somnolence with use of

pain medication, decreased confidence 



PLAN: The patient will benefit from skilled physical therapy services 5 times 

per week for 2 weeks including: 

Therapeutic Exercise

Therapeutic Activities

Transfer Training

Gait Training

Stair Training

Manual Therapy

ADL's

Safety Training

Neuromuscular Re-educ.

Pt/Caregiver Training

Bed Mobility





   

Thank you for this referral.  If you have any questions, concerns, or comments 

about this report or plan, please contact me at [phone].

Anne Jacobson, PT, DPT 

[facility]

## 2018-09-04 VITALS — DIASTOLIC BLOOD PRESSURE: 75 MMHG | SYSTOLIC BLOOD PRESSURE: 121 MMHG

## 2018-09-04 VITALS — SYSTOLIC BLOOD PRESSURE: 108 MMHG | DIASTOLIC BLOOD PRESSURE: 69 MMHG

## 2018-09-04 RX ADMIN — CETIRIZINE HYDROCHLORIDE SCH MG: 10 TABLET, FILM COATED ORAL at 08:55

## 2018-09-04 RX ADMIN — OXYCODONE HYDROCHLORIDE AND ACETAMINOPHEN SCH MG: 500 TABLET ORAL at 08:56

## 2018-09-04 RX ADMIN — HYDROCODONE BITARTRATE AND ACETAMINOPHEN PRN EACH: 5; 325 TABLET ORAL at 15:50

## 2018-09-04 RX ADMIN — CYANOCOBALAMIN TAB 1000 MCG SCH MCG: 1000 TAB at 08:57

## 2018-09-04 RX ADMIN — DOCUSATE SODIUM SCH MG: 100 CAPSULE, LIQUID FILLED ORAL at 08:57

## 2018-09-04 RX ADMIN — Medication SCH EACH: at 08:56

## 2018-09-04 RX ADMIN — MONTELUKAST SODIUM SCH MG: 10 TABLET, FILM COATED ORAL at 20:32

## 2018-09-04 RX ADMIN — DOCUSATE SODIUM SCH MG: 100 CAPSULE, LIQUID FILLED ORAL at 20:32

## 2018-09-04 RX ADMIN — POLYETHYLENE GLYCOL 3350 SCH GM: 17 POWDER, FOR SOLUTION ORAL at 08:55

## 2018-09-04 RX ADMIN — HYDROCODONE BITARTRATE AND ACETAMINOPHEN PRN EACH: 5; 325 TABLET ORAL at 06:01

## 2018-09-04 RX ADMIN — VITAMIN E CAP 400 UNIT SCH INTLU: 400 CAP at 08:57

## 2018-09-04 RX ADMIN — Medication SCH EACH: at 08:57

## 2018-09-04 RX ADMIN — VITAMIN D, TAB 1000IU (100/BT) SCH UNIT: 25 TAB at 08:57

## 2018-09-04 RX ADMIN — Medication SCH MG: at 20:32

## 2018-09-04 RX ADMIN — RIVAROXABAN SCH MG: 10 TABLET, FILM COATED ORAL at 08:57

## 2018-09-04 NOTE — MEDICAL NUTRITION THERAPY
Nutrition Anthropometrics


Height (Inches):  67.00


Height (Calculated Centimeters:  170.437141


Weight (Pounds):  231


Weight (Calculated Kilograms):  104.865


BMI:  36.2


Hunter Nutrition Score:         Adequate 


Hunter Nutrition Risk Score:  17


Dietary Referral


Nutrition Risk Factors:      


Nutrition Risk Comment:





Physical Findings


Physical Appearance:  Obese BMI 30-39


Skin Appearance


Skin Appearance:


Edema


Edema Location Modifier:  


Edema Location:               


Type of Edema:                 


Degree of Edema:


Gastrointestinal Symptoms


GI Symtoms:                Constipation 


Tube Present:               


Bowel Sounds:              


Recent Bowel Pattern:   Constipated 


Stool Characteristics:





Nutritional Diagnosis


Nutritional Risk Acuity 4:  Good Appetite


Past Medical History:  


Hx of Afib, HTN, MARIAN, and intrinsic asthma.


Nutritional Acuity:  4-Low


Diet Type:  Diet as Tolerated MICHAEL/REG


Nutrition Intervention:  Cont diet as ordered, Encourage intake





Nutrition Monitoring & Eval


RD Patient Assessment Time:  30 minutes


RD Assessment Type:  RD Assessment


Patient Nutrition Acuity:  4-Low


Follow Up Date:  Sep 11, 2018


Nutritional Comment:  


9/4. Admitted for therapy and strengthening after back surgery. Pt is on


MICHAEL, and has consumed 50-90% of regular sized meals. Pt is 67in, 231lbs,


and has a obese class 2 BMI of 36.2. No updated labs available for pt.


Encourage intake and monitor labs. 











MADDIESIERRA                  Sep 4, 2018 08:26

## 2018-09-05 VITALS — DIASTOLIC BLOOD PRESSURE: 71 MMHG | SYSTOLIC BLOOD PRESSURE: 104 MMHG

## 2018-09-05 VITALS — DIASTOLIC BLOOD PRESSURE: 81 MMHG | SYSTOLIC BLOOD PRESSURE: 124 MMHG

## 2018-09-05 RX ADMIN — CYANOCOBALAMIN TAB 1000 MCG SCH MCG: 1000 TAB at 09:18

## 2018-09-05 RX ADMIN — HYDROCODONE BITARTRATE AND ACETAMINOPHEN PRN EACH: 5; 325 TABLET ORAL at 23:38

## 2018-09-05 RX ADMIN — POLYETHYLENE GLYCOL 3350 SCH GM: 17 POWDER, FOR SOLUTION ORAL at 09:17

## 2018-09-05 RX ADMIN — DOCUSATE SODIUM SCH MG: 100 CAPSULE, LIQUID FILLED ORAL at 09:18

## 2018-09-05 RX ADMIN — VITAMIN E CAP 400 UNIT SCH INTLU: 400 CAP at 09:18

## 2018-09-05 RX ADMIN — MONTELUKAST SODIUM SCH MG: 10 TABLET, FILM COATED ORAL at 20:53

## 2018-09-05 RX ADMIN — Medication SCH MG: at 20:53

## 2018-09-05 RX ADMIN — Medication SCH EACH: at 09:18

## 2018-09-05 RX ADMIN — VITAMIN D, TAB 1000IU (100/BT) SCH UNIT: 25 TAB at 09:18

## 2018-09-05 RX ADMIN — FLUTICASONE PROPIONATE AND SALMETEROL SCH EACH: 50; 250 POWDER RESPIRATORY (INHALATION) at 17:02

## 2018-09-05 RX ADMIN — DOCUSATE SODIUM SCH MG: 100 CAPSULE, LIQUID FILLED ORAL at 20:53

## 2018-09-05 RX ADMIN — CETIRIZINE HYDROCHLORIDE SCH MG: 10 TABLET, FILM COATED ORAL at 09:18

## 2018-09-05 RX ADMIN — FLUTICASONE PROPIONATE AND SALMETEROL SCH EACH: 50; 250 POWDER RESPIRATORY (INHALATION) at 20:25

## 2018-09-05 RX ADMIN — DIAZEPAM PRN MG: 5 TABLET ORAL at 20:53

## 2018-09-05 RX ADMIN — HYDROCODONE BITARTRATE AND ACETAMINOPHEN PRN EACH: 5; 325 TABLET ORAL at 14:01

## 2018-09-05 RX ADMIN — OXYCODONE HYDROCHLORIDE AND ACETAMINOPHEN SCH MG: 500 TABLET ORAL at 09:18

## 2018-09-05 RX ADMIN — HYDROCODONE BITARTRATE AND ACETAMINOPHEN PRN EACH: 5; 325 TABLET ORAL at 07:41

## 2018-09-05 RX ADMIN — RIVAROXABAN SCH MG: 10 TABLET, FILM COATED ORAL at 09:19

## 2018-09-05 RX ADMIN — Medication SCH EACH: at 09:17

## 2018-09-05 NOTE — HOSPITALIST PROGRESS NOTE
Subjective


Progress Notes


Subjective


He has not been progressing well with ambulation. He reports he is only able to 


stand by the bed or get to the chair. He has complaints of increased muscle pain


in the left upper thigh.





Patient Complains of:


Cardiovascular:  No: Chest Pain


Respiratory:  No: Shortness of Breath





Physical Exam





Vital Signs








  Date Time  Temp Pulse Resp B/P (MAP) Pulse Ox O2 Delivery O2 Flow Rate FiO2


 


9/5/18 12:37     97 Room Air  


 


9/5/18 08:04  74 14     


 


9/5/18 07:22 99.2   124/81 (95)    


 


9/4/18 15:30       0.5 














Intake and Output 


 


 9/5/18





 06:59


 


Intake Total 800 ml


 


Output Total 900 ml


 


Balance -100 ml


 


 


 


Intake Oral 800 ml


 


Output Urine Total 900 ml


 


# Voids 6


 


# Bowel Movements 1








General Appearance:  Alert, Awake, No Acute Distress, Afebrile


Neuro:  No Gross deficits


Cardiovascular:  Regular Rate and Rhythm


Respiratory:  No Respiratory Distress, Other (expiratory wheezes)


GI:  Soft and Non-Tender


Musculoskeletal:  Other (lateral muscles to left thigh feel tight upon 


palpation)


Extremities:  Warm, Perfused; No Edema


Psych:  Alert & Oriented X3, Appropriate Mood & Affect





Assessment and Plan


Problems:  


(1) Spinal stenosis


Status:  Acute


Assessment & Plan:  Post laminectomy and fusion. Progressing very slowly. 


Reports significant pain and muscle tightness. Will try to achieve reasonable 


pain control with Lortab/low dose diazepam.





(2) Atrial fibrillation


Status:  Chronic


Assessment & Plan:  He has been rate controlled without medication. He is now 


back on his usual Xarelto 20mg daily for CVA prophylaxis. 





(3) HTN (hypertension)


Status:  Chronic


Assessment & Plan:  His BPs have been in normal range without medications. Will 


monitor BPs and resume losartan only when needed.





(4) Intrinsic asthma


Status:  Chronic


Assessment & Plan:  Continue Singulair and albuterol. He has also been using 


guaifenesin. He reports he also uses Advair HFA at home, will resume Advair 


inhaler. Continue prn albuterol nebulizers. 





(5) MARIAN (obstructive sleep apnea)


Status:  Chronic


Assessment & Plan:  Continue CPAP.














TEENA BISWAS             Sep 5, 2018 14:33

## 2018-09-06 VITALS — SYSTOLIC BLOOD PRESSURE: 103 MMHG | DIASTOLIC BLOOD PRESSURE: 71 MMHG

## 2018-09-06 VITALS — DIASTOLIC BLOOD PRESSURE: 44 MMHG | SYSTOLIC BLOOD PRESSURE: 92 MMHG

## 2018-09-06 VITALS — DIASTOLIC BLOOD PRESSURE: 66 MMHG | SYSTOLIC BLOOD PRESSURE: 111 MMHG

## 2018-09-06 RX ADMIN — FLUTICASONE PROPIONATE AND SALMETEROL SCH EACH: 50; 250 POWDER RESPIRATORY (INHALATION) at 20:49

## 2018-09-06 RX ADMIN — Medication SCH MG: at 20:34

## 2018-09-06 RX ADMIN — HYDROCODONE BITARTRATE AND ACETAMINOPHEN PRN EACH: 5; 325 TABLET ORAL at 10:21

## 2018-09-06 RX ADMIN — Medication SCH EACH: at 08:54

## 2018-09-06 RX ADMIN — Medication SCH EACH: at 08:53

## 2018-09-06 RX ADMIN — OXYCODONE HYDROCHLORIDE AND ACETAMINOPHEN SCH MG: 500 TABLET ORAL at 08:54

## 2018-09-06 RX ADMIN — POLYETHYLENE GLYCOL 3350 SCH GM: 17 POWDER, FOR SOLUTION ORAL at 08:54

## 2018-09-06 RX ADMIN — VITAMIN D, TAB 1000IU (100/BT) SCH UNIT: 25 TAB at 08:53

## 2018-09-06 RX ADMIN — CETIRIZINE HYDROCHLORIDE SCH MG: 10 TABLET, FILM COATED ORAL at 08:54

## 2018-09-06 RX ADMIN — RIVAROXABAN SCH MG: 10 TABLET, FILM COATED ORAL at 08:54

## 2018-09-06 RX ADMIN — CYANOCOBALAMIN TAB 1000 MCG SCH MCG: 1000 TAB at 08:53

## 2018-09-06 RX ADMIN — DOCUSATE SODIUM SCH MG: 100 CAPSULE, LIQUID FILLED ORAL at 20:35

## 2018-09-06 RX ADMIN — DOCUSATE SODIUM SCH MG: 100 CAPSULE, LIQUID FILLED ORAL at 08:53

## 2018-09-06 RX ADMIN — MONTELUKAST SODIUM SCH MG: 10 TABLET, FILM COATED ORAL at 20:34

## 2018-09-06 RX ADMIN — FLUTICASONE PROPIONATE AND SALMETEROL SCH EACH: 50; 250 POWDER RESPIRATORY (INHALATION) at 18:00

## 2018-09-06 RX ADMIN — VITAMIN E CAP 400 UNIT SCH INTLU: 400 CAP at 08:53

## 2018-09-06 RX ADMIN — DIAZEPAM PRN MG: 5 TABLET ORAL at 20:35

## 2018-09-06 RX ADMIN — HYDROCODONE BITARTRATE AND ACETAMINOPHEN PRN EACH: 5; 325 TABLET ORAL at 16:37

## 2018-09-07 VITALS — SYSTOLIC BLOOD PRESSURE: 140 MMHG | DIASTOLIC BLOOD PRESSURE: 65 MMHG

## 2018-09-07 VITALS — DIASTOLIC BLOOD PRESSURE: 72 MMHG | SYSTOLIC BLOOD PRESSURE: 132 MMHG

## 2018-09-07 RX ADMIN — MONTELUKAST SODIUM SCH MG: 10 TABLET, FILM COATED ORAL at 20:28

## 2018-09-07 RX ADMIN — POLYETHYLENE GLYCOL 3350 SCH GM: 17 POWDER, FOR SOLUTION ORAL at 08:39

## 2018-09-07 RX ADMIN — CETIRIZINE HYDROCHLORIDE SCH MG: 10 TABLET, FILM COATED ORAL at 08:38

## 2018-09-07 RX ADMIN — Medication SCH EACH: at 08:39

## 2018-09-07 RX ADMIN — OXYCODONE HYDROCHLORIDE AND ACETAMINOPHEN SCH MG: 500 TABLET ORAL at 08:38

## 2018-09-07 RX ADMIN — FLUTICASONE PROPIONATE AND SALMETEROL SCH EACH: 50; 250 POWDER RESPIRATORY (INHALATION) at 18:00

## 2018-09-07 RX ADMIN — FLUTICASONE PROPIONATE AND SALMETEROL SCH EACH: 50; 250 POWDER RESPIRATORY (INHALATION) at 05:40

## 2018-09-07 RX ADMIN — RIVAROXABAN SCH MG: 10 TABLET, FILM COATED ORAL at 08:39

## 2018-09-07 RX ADMIN — VITAMIN D, TAB 1000IU (100/BT) SCH UNIT: 25 TAB at 08:39

## 2018-09-07 RX ADMIN — DOCUSATE SODIUM SCH MG: 100 CAPSULE, LIQUID FILLED ORAL at 19:28

## 2018-09-07 RX ADMIN — HYDROCODONE BITARTRATE AND ACETAMINOPHEN PRN EACH: 5; 325 TABLET ORAL at 13:47

## 2018-09-07 RX ADMIN — Medication SCH EACH: at 08:38

## 2018-09-07 RX ADMIN — VITAMIN E CAP 400 UNIT SCH INTLU: 400 CAP at 08:38

## 2018-09-07 RX ADMIN — HYDROCODONE BITARTRATE AND ACETAMINOPHEN PRN EACH: 5; 325 TABLET ORAL at 07:39

## 2018-09-07 RX ADMIN — DOCUSATE SODIUM SCH MG: 100 CAPSULE, LIQUID FILLED ORAL at 08:39

## 2018-09-07 RX ADMIN — CYANOCOBALAMIN TAB 1000 MCG SCH MCG: 1000 TAB at 08:39

## 2018-09-07 RX ADMIN — ALBUTEROL SULFATE PRN PUFF: 90 AEROSOL, METERED RESPIRATORY (INHALATION) at 08:08

## 2018-09-07 RX ADMIN — Medication SCH MG: at 20:28

## 2018-09-08 VITALS — DIASTOLIC BLOOD PRESSURE: 78 MMHG | SYSTOLIC BLOOD PRESSURE: 126 MMHG

## 2018-09-08 VITALS — DIASTOLIC BLOOD PRESSURE: 70 MMHG | SYSTOLIC BLOOD PRESSURE: 109 MMHG

## 2018-09-08 RX ADMIN — Medication SCH EACH: at 09:22

## 2018-09-08 RX ADMIN — RIVAROXABAN SCH MG: 10 TABLET, FILM COATED ORAL at 09:20

## 2018-09-08 RX ADMIN — OXYCODONE HYDROCHLORIDE AND ACETAMINOPHEN SCH MG: 500 TABLET ORAL at 09:22

## 2018-09-08 RX ADMIN — CYANOCOBALAMIN TAB 1000 MCG SCH MCG: 1000 TAB at 09:21

## 2018-09-08 RX ADMIN — FLUTICASONE PROPIONATE AND SALMETEROL SCH EACH: 50; 250 POWDER RESPIRATORY (INHALATION) at 18:01

## 2018-09-08 RX ADMIN — Medication SCH EACH: at 09:21

## 2018-09-08 RX ADMIN — HYDROCODONE BITARTRATE AND ACETAMINOPHEN PRN EACH: 5; 325 TABLET ORAL at 09:21

## 2018-09-08 RX ADMIN — FLUTICASONE PROPIONATE AND SALMETEROL SCH EACH: 50; 250 POWDER RESPIRATORY (INHALATION) at 05:31

## 2018-09-08 RX ADMIN — DOCUSATE SODIUM SCH MG: 100 CAPSULE, LIQUID FILLED ORAL at 20:49

## 2018-09-08 RX ADMIN — MONTELUKAST SODIUM SCH MG: 10 TABLET, FILM COATED ORAL at 20:48

## 2018-09-08 RX ADMIN — HYDROCODONE BITARTRATE AND ACETAMINOPHEN PRN EACH: 5; 325 TABLET ORAL at 15:24

## 2018-09-08 RX ADMIN — DIAZEPAM PRN MG: 5 TABLET ORAL at 20:48

## 2018-09-08 RX ADMIN — POLYETHYLENE GLYCOL 3350 SCH GM: 17 POWDER, FOR SOLUTION ORAL at 09:00

## 2018-09-08 RX ADMIN — CETIRIZINE HYDROCHLORIDE SCH MG: 10 TABLET, FILM COATED ORAL at 09:22

## 2018-09-08 RX ADMIN — DOCUSATE SODIUM SCH MG: 100 CAPSULE, LIQUID FILLED ORAL at 09:00

## 2018-09-08 RX ADMIN — Medication SCH MG: at 20:47

## 2018-09-08 RX ADMIN — SIMETHICONE CHEW TAB 80 MG PRN MG: 80 TABLET ORAL at 19:28

## 2018-09-08 RX ADMIN — VITAMIN E CAP 400 UNIT SCH INTLU: 400 CAP at 09:20

## 2018-09-08 RX ADMIN — VITAMIN D, TAB 1000IU (100/BT) SCH UNIT: 25 TAB at 09:20

## 2018-09-09 VITALS — SYSTOLIC BLOOD PRESSURE: 133 MMHG | DIASTOLIC BLOOD PRESSURE: 71 MMHG

## 2018-09-09 VITALS — SYSTOLIC BLOOD PRESSURE: 138 MMHG | DIASTOLIC BLOOD PRESSURE: 81 MMHG

## 2018-09-09 RX ADMIN — FLUTICASONE PROPIONATE AND SALMETEROL SCH EACH: 50; 250 POWDER RESPIRATORY (INHALATION) at 05:27

## 2018-09-09 RX ADMIN — MONTELUKAST SODIUM SCH MG: 10 TABLET, FILM COATED ORAL at 21:06

## 2018-09-09 RX ADMIN — Medication SCH MG: at 21:06

## 2018-09-09 RX ADMIN — OXYCODONE HYDROCHLORIDE AND ACETAMINOPHEN SCH MG: 500 TABLET ORAL at 09:13

## 2018-09-09 RX ADMIN — FLUTICASONE PROPIONATE AND SALMETEROL SCH EACH: 50; 250 POWDER RESPIRATORY (INHALATION) at 13:14

## 2018-09-09 RX ADMIN — SIMETHICONE CHEW TAB 80 MG PRN MG: 80 TABLET ORAL at 21:06

## 2018-09-09 RX ADMIN — CYANOCOBALAMIN TAB 1000 MCG SCH MCG: 1000 TAB at 09:12

## 2018-09-09 RX ADMIN — CETIRIZINE HYDROCHLORIDE SCH MG: 10 TABLET, FILM COATED ORAL at 09:12

## 2018-09-09 RX ADMIN — VITAMIN D, TAB 1000IU (100/BT) SCH UNIT: 25 TAB at 09:12

## 2018-09-09 RX ADMIN — DOCUSATE SODIUM SCH MG: 100 CAPSULE, LIQUID FILLED ORAL at 09:00

## 2018-09-09 RX ADMIN — Medication SCH EACH: at 09:12

## 2018-09-09 RX ADMIN — RIVAROXABAN SCH MG: 10 TABLET, FILM COATED ORAL at 09:11

## 2018-09-09 RX ADMIN — DOCUSATE SODIUM SCH MG: 100 CAPSULE, LIQUID FILLED ORAL at 21:00

## 2018-09-09 RX ADMIN — POLYETHYLENE GLYCOL 3350 SCH GM: 17 POWDER, FOR SOLUTION ORAL at 09:00

## 2018-09-09 RX ADMIN — DIAZEPAM PRN MG: 5 TABLET ORAL at 21:06

## 2018-09-09 RX ADMIN — VITAMIN E CAP 400 UNIT SCH INTLU: 400 CAP at 09:11

## 2018-09-10 VITALS — SYSTOLIC BLOOD PRESSURE: 129 MMHG | DIASTOLIC BLOOD PRESSURE: 71 MMHG

## 2018-09-10 VITALS — DIASTOLIC BLOOD PRESSURE: 77 MMHG | SYSTOLIC BLOOD PRESSURE: 117 MMHG

## 2018-09-10 RX ADMIN — DIAZEPAM PRN MG: 5 TABLET ORAL at 21:22

## 2018-09-10 RX ADMIN — FLUTICASONE PROPIONATE AND SALMETEROL SCH EACH: 50; 250 POWDER RESPIRATORY (INHALATION) at 05:22

## 2018-09-10 RX ADMIN — FLUTICASONE PROPIONATE AND SALMETEROL SCH EACH: 50; 250 POWDER RESPIRATORY (INHALATION) at 16:09

## 2018-09-10 RX ADMIN — CYANOCOBALAMIN TAB 1000 MCG SCH MCG: 1000 TAB at 09:08

## 2018-09-10 RX ADMIN — POLYETHYLENE GLYCOL 3350 SCH GM: 17 POWDER, FOR SOLUTION ORAL at 09:00

## 2018-09-10 RX ADMIN — OXYCODONE HYDROCHLORIDE AND ACETAMINOPHEN SCH MG: 500 TABLET ORAL at 09:09

## 2018-09-10 RX ADMIN — ALBUTEROL SULFATE PRN PUFF: 90 AEROSOL, METERED RESPIRATORY (INHALATION) at 16:09

## 2018-09-10 RX ADMIN — DOCUSATE SODIUM SCH MG: 100 CAPSULE, LIQUID FILLED ORAL at 09:00

## 2018-09-10 RX ADMIN — DOCUSATE SODIUM SCH MG: 100 CAPSULE, LIQUID FILLED ORAL at 21:00

## 2018-09-10 RX ADMIN — MONTELUKAST SODIUM SCH MG: 10 TABLET, FILM COATED ORAL at 21:22

## 2018-09-10 RX ADMIN — Medication SCH EACH: at 09:08

## 2018-09-10 RX ADMIN — RIVAROXABAN SCH MG: 10 TABLET, FILM COATED ORAL at 09:09

## 2018-09-10 RX ADMIN — CETIRIZINE HYDROCHLORIDE SCH MG: 10 TABLET, FILM COATED ORAL at 09:09

## 2018-09-10 RX ADMIN — VITAMIN E CAP 400 UNIT SCH INTLU: 400 CAP at 09:08

## 2018-09-10 RX ADMIN — SIMETHICONE CHEW TAB 80 MG PRN MG: 80 TABLET ORAL at 21:22

## 2018-09-10 RX ADMIN — HYDROCODONE BITARTRATE AND ACETAMINOPHEN PRN EACH: 5; 325 TABLET ORAL at 10:56

## 2018-09-10 RX ADMIN — VITAMIN D, TAB 1000IU (100/BT) SCH UNIT: 25 TAB at 09:08

## 2018-09-10 RX ADMIN — Medication SCH MG: at 21:22

## 2018-09-10 RX ADMIN — Medication SCH EACH: at 09:09

## 2018-09-10 NOTE — MEDICAL NUTRITION THERAPY
Nutrition Anthropometrics


Height (Inches):  67.00


Height (Calculated Centimeters:  170.844701


Weight (Pounds):  231


Weight (Calculated Kilograms):  104.865


BMI:  36.2


Hunter Nutrition Score:         Adequate 


Hunter Nutrition Risk Score:  15


Dietary Referral


Nutrition Risk Factors:      


Nutrition Risk Comment:





Physical Findings


Physical Appearance:  Obese BMI 30-39


Skin Appearance


Skin Appearance:


Edema


Edema Location Modifier: Both 


Edema Location:              Leg 


Type of Edema:                 


Degree of Edema:


Gastrointestinal Symptoms


GI Symtoms:                Change in Bowel Pattern 


Tube Present:               


Bowel Sounds:              


Recent Bowel Pattern:   Constipated 


Stool Characteristics:





Nutritional Diagnosis


Nutritional Risk Acuity 4:  Good Appetite


Past Medical History:  


Hx of Afib, HTN, MARIAN, and intrinsic asthma.


Nutritional Acuity:  4-Low


Diet Type:  Diet as Tolerated MICHAEL/REG


Nutrition Intervention:  Cont diet as ordered, Encourage intake





Nutrition Monitoring & Eval


RD Patient Assessment Time:  15 minutes


RD Assessment Type:  RD Re-Assessment


Patient Nutrition Acuity:  4-Low


Follow Up Date:  Sep 18, 2018


Nutritional Comment:  


9/4. Admitted for therapy and strengthening after back surgery. Pt is on


MICHAEL, and has consumed 50-90% of regular sized meals. Pt is 67in, 231lbs,


and has a obese class 2 BMI of 36.2. No updated labs available for pt.


Encourage intake and monitor labs. MR





9/10. Pt very weak and progressing very slowly. Cont on MICHAEL, consuming


% of regular sized meals. Generally pt refuses an afternoon snack.


No new labs available. No weight gain or loss. Will cont to monitor pt and


encourage intake. 











MADDIESIERRA                 Sep 10, 2018 08:08

## 2018-09-11 VITALS — DIASTOLIC BLOOD PRESSURE: 76 MMHG | SYSTOLIC BLOOD PRESSURE: 123 MMHG

## 2018-09-11 VITALS — SYSTOLIC BLOOD PRESSURE: 110 MMHG | DIASTOLIC BLOOD PRESSURE: 75 MMHG

## 2018-09-11 RX ADMIN — POLYETHYLENE GLYCOL 3350 SCH GM: 17 POWDER, FOR SOLUTION ORAL at 08:47

## 2018-09-11 RX ADMIN — Medication SCH EACH: at 08:51

## 2018-09-11 RX ADMIN — CETIRIZINE HYDROCHLORIDE SCH MG: 10 TABLET, FILM COATED ORAL at 08:51

## 2018-09-11 RX ADMIN — ALBUTEROL SULFATE PRN PUFF: 90 AEROSOL, METERED RESPIRATORY (INHALATION) at 10:40

## 2018-09-11 RX ADMIN — Medication SCH MG: at 20:43

## 2018-09-11 RX ADMIN — RIVAROXABAN SCH MG: 10 TABLET, FILM COATED ORAL at 08:51

## 2018-09-11 RX ADMIN — SIMETHICONE CHEW TAB 80 MG PRN MG: 80 TABLET ORAL at 15:46

## 2018-09-11 RX ADMIN — Medication SCH EACH: at 08:48

## 2018-09-11 RX ADMIN — MONTELUKAST SODIUM SCH MG: 10 TABLET, FILM COATED ORAL at 20:43

## 2018-09-11 RX ADMIN — OXYCODONE HYDROCHLORIDE AND ACETAMINOPHEN SCH MG: 500 TABLET ORAL at 08:50

## 2018-09-11 RX ADMIN — CYANOCOBALAMIN TAB 1000 MCG SCH MCG: 1000 TAB at 08:49

## 2018-09-11 RX ADMIN — DOCUSATE SODIUM SCH MG: 100 CAPSULE, LIQUID FILLED ORAL at 08:46

## 2018-09-11 RX ADMIN — DOCUSATE SODIUM SCH MG: 100 CAPSULE, LIQUID FILLED ORAL at 21:00

## 2018-09-11 RX ADMIN — VITAMIN D, TAB 1000IU (100/BT) SCH UNIT: 25 TAB at 08:50

## 2018-09-11 RX ADMIN — VITAMIN E CAP 400 UNIT SCH INTLU: 400 CAP at 08:49

## 2018-09-12 VITALS — DIASTOLIC BLOOD PRESSURE: 59 MMHG | SYSTOLIC BLOOD PRESSURE: 107 MMHG

## 2018-09-12 LAB — PLATELET COUNT, AUTOMATED: 417 K/UL (ref 150–450)

## 2018-09-12 RX ADMIN — OXYCODONE HYDROCHLORIDE AND ACETAMINOPHEN SCH MG: 500 TABLET ORAL at 08:59

## 2018-09-12 RX ADMIN — RIVAROXABAN SCH MG: 10 TABLET, FILM COATED ORAL at 08:58

## 2018-09-12 RX ADMIN — CETIRIZINE HYDROCHLORIDE SCH MG: 10 TABLET, FILM COATED ORAL at 08:59

## 2018-09-12 RX ADMIN — Medication SCH EACH: at 08:58

## 2018-09-12 RX ADMIN — VITAMIN E CAP 400 UNIT SCH INTLU: 400 CAP at 08:59

## 2018-09-12 RX ADMIN — VITAMIN D, TAB 1000IU (100/BT) SCH UNIT: 25 TAB at 08:58

## 2018-09-12 RX ADMIN — POLYETHYLENE GLYCOL 3350 SCH GM: 17 POWDER, FOR SOLUTION ORAL at 09:00

## 2018-09-12 RX ADMIN — DOCUSATE SODIUM SCH MG: 100 CAPSULE, LIQUID FILLED ORAL at 08:58

## 2018-09-12 RX ADMIN — CYANOCOBALAMIN TAB 1000 MCG SCH MCG: 1000 TAB at 08:59

## 2018-09-12 NOTE — PT ECF NOTE
Type of Note: Discharge Summary 

Primary Medical Diagnosis: S/P L3-L5 laminectomy with L-L5 posterior lateral 

instrumented fusion (8/29/18)

Physical Therapy Discharge Date: 9/12/18



SUBJECTIVE: 

   Prior Hospitalization: [facility] 8/29/18-9/3/18

   Prior Level of Function: Drew with use of RW (pt's spouse reports that he was

ambulating short household distances at [facility])

   Prior Living Status:  Single level house, Spouse

   Community Services:   Support adequate

   Home Accessibility:   3 stairs with rails, All needs on one level

   Equipment Owned:  Front wheeled walker, Rollator

      

   Medical Complications/Past Medical History: See EMR for details 

   Psychosocial Support: supportive wife 



          

OBJECTIVE:

   Strength: 

      Right Lower Extremity:

         DF: <3/5

         Knee flexion: 4/5

         Knee extension:3+/5

         Hip flexion: <3/5





      Left Lower Extremity:

         DF: 4/5

         Knee flexion: 4/5

         Knee extension: 4/5

         Hip flexion: <3/5

   ROM: Lacking full AROM of R) DF    

   Sensation: WNL

   Other Neuro findings: None noted 

   

   Bed Mobility: Emily 

   Transfers:  SBA with 4WW   

   Gait:  SBA x 210 with 4WW   

   Stairs: 4 stairs with railing and CGA 







ASSESSMENT:  Pt did not achieve all PT goals but elected to d/c home with 

assistance from spouse and [facility] services prior to meeting PT goals. Pt is 

demonstrating improved tolerance to functional mobility as well as increased 

independence, with spouse able to provide Emily for ayan/doffing LSO. Pt will d/c

with [facility] services in placed. 

   

Problem List/Current Limitations: 

Pain

Decreased activity garret

Decreased strength

Decreased ROM

Decreased balance

Decreased problem solving

Decreased initiation



   Short Term Goals:  

   1: Pt to complete STS transfers with Drew and least restrictive AD (not met) 

   2: Pt to ambulate 50' with Drew and least restrictive AD (not met) 

   3: Pt to complete bed mobility with log roll technique and Drew (not met) 

   4: Pt to demonstrate ability to ayan/doff LSO with Emily from spouse. (met)



   Long Term Goals: Pt to d/c home with decreased need of assistance from others



   Patient Goals: Discharge home with spouse 







PLAN: The patient will discharge home with assistance from spouse and HHC 

services. 





   

Thank you for this referral.  If you have any questions, concerns, or comments 

about this report or plan, please contact me at [phone].

Anne Jacobson, PT, DPT 

[facility]

## 2018-09-12 NOTE — OT ECF NOTE
Type of Note: Discharge Note 

Primary Medical Diagnosis: Generalized weakness s/p L3-L5 laminectomy with L3-L5

PLIF. **Lumbar precautions, LSO donned as tolerated with ambulation**

Occupational Therapy Evaluation Date: 9/3/18



SUBJECTIVE: 

   Prior Hospitalization: [facility] 18 thru 9/3/18. DOS: 18 with Dr. Farr 

   Prior Level of Function: Modified Independent with 4WW. Assist from spouse 

for IADLs. Spouse reports pt ambulated short household distances prior to sx.

   Prior Living Status: 

Single level house

Spouse

Assist by family

  

   Community Services: No known needs



   Home Accessibility: 

Stairs with rails

All needs on one level

Tub/shower combination



   Equipment Owned: 

Front wheeled walker

Rollator

Extended tub bench



   Medical Complications/Past Medical History: Please refer to EMR

   Psychosocial Support: Supportive spouse 

   Pain Scale (0-10): 10.5/10. Emotional support, positioning, and ice offered.



       

OBJECTIVE:

   Strength: 

      MMT:         Right   Left

      Shoulder Flexion      WFL                 WFL      

      Elbow Flexion         WFL                 WFL      

               Wrist Extension         WFL                 WFL      

               WFL                 WFL      

         (5= normal, 4= good, 3= fair, 2= poor, 1= trace)



   ROM: Both upper extremities, WFL

   

   Sensation: Intact, no concerns

   Functional Transfer: 

      Assistive Device: Mod (I) with 4WW



   ADL:

      Upper body dressing:  

      Assistive device:    

      Upper body dressing ability: Min A from spouse to don/doff LSO. Family 

education provided.



      Lower body dressing:

      Assistive device: Sock aid/reacher

      Lower body dressing ability: Modified Independent 



      Toileting:

      Assistive device:  Raised toilet seat, Grab rails

      Toileting ability:  Modified Independent 





      Grooming/hygiene: 

      Assistive device: Standing 

      Grooming ability: Modified Independent 



      Bathing:

      Assistive device: SBA

      Bathing ability: Modified Independent 



Standardized Assessment:

   Barthel Index of Activities of Daily Livin/20 upon initial evaluation 

(9/3/18). 20/20 upon discharge (18)/. 



ASSESSMENT:  "Zack" presented to [facility] requiring 2 person assist for sit<>stands 

with RW and Max Ax1 for mobility with EZ lift. He is currently Mod (I) for ADLs 

with exception of managing LSO brace. He has met all skilled OT goals and 

desires to discharge home at current level of function with  services. 



   Problem List/Current Limitations:   

Pain

Decreased activity tolerance 

Decreased strength

Decreased ROM

Generalized weakness

Poor safety awareness

Decreased initiation



   Short Term Goals: 

1) Pt will be Mod (I) toilet task. GOAL MET

2) Pt will be Mod (I) UB/LB dressing. GOAL MET

3) Pt will be Mod (I) grooming/hygiene. GOAL MET

4) Pt will be Min A shower task. GOAL MET

5) Pt Barthel Index of ADLs score will improve by 2 points.GOAL MET



   Long Term Goals: Return home with assist from spouse for IADLs and possible 

HH service s

   Patient Goals: Return home with HH services and assist from spouse

   Rehabilitation Prognosis: Fair

   Barriers to Discharge: Pain, Motivation, Decreased mobility at PLOF



      

PLAN: Return home with HH services and assist from spouse

   



Thank you for this referral.  If you have any questions, concerns, or comments 

about this report or plan, please contact me at [phone].



Melia Weeks MS, OTR/L

Occupational Therapist 



[facility]

## 2018-09-12 NOTE — HOSPITALIST PROGRESS NOTE
Subjective


Progress Notes


Subjective


The patient states his back hurts occasionally but the muscle spasm has improved


and he is able to get around. He feels comfortable going home.





Physical Exam





Vital Signs








  Date Time  Temp Pulse Resp B/P (MAP) Pulse Ox O2 Delivery O2 Flow Rate FiO2


 


9/12/18 11:30     92 Room Air  


 


9/12/18 08:00 98.1 60 16 107/59 (75)    














Intake and Output 


 


 9/12/18





 06:59


 


Intake Total 1245 ml


 


Output Total 700 ml


 


Balance 545 ml


 


 


 


Intake Oral 1245 ml


 


Output Urine Total 700 ml


 


# Voids 2


 


# Bowel Movements 2








General Appearance:  Alert, Awake, No Acute Distress


Neuro:  No Gross deficits


Eyes:  PERRLA


Cardiovascular:  Regular Rate and Rhythm


Respiratory:  Clear to Auscultation


GI:  Soft and Non-Tender


Extremities:  Warm, Perfused, Other (No edema.)


Psych:  Appropriate Mood & Affect


Result Diagram:  


9/12/18 0625 9/12/18 0625








Assessment and Plan


Problems:  


(1) Spinal stenosis


Status:  Acute


Assessment & Plan:  Post laminectomy and fusion. Progressing well now. Reports 


some pain but muscle tightness is markedly improved. Will DC today. Will send 


with a weeks worth of Lortab and Valium.





(2) Atrial fibrillation


Status:  Chronic


Assessment & Plan:  He has been rate controlled without medication. He is now 


back on his usual Xarelto 20mg daily for CVA prophylaxis. 





(3) HTN (hypertension)


Status:  Chronic


Assessment & Plan:  His BPs have been in normal range without medications. The 


patient will monitor his BP at home and restart medication if his systolic BP is


over 140. 





(4) Intrinsic asthma


Status:  Chronic


Assessment & Plan:  Continue Singulair, Advair HFA and albuterol. He has also 


been using guaifenesin. 





(5) MARIAN (obstructive sleep apnea)


Status:  Chronic


Assessment & Plan:  Continue CPAP.





Time Spent on Plan of Care:  < 30 min











ENDY ROBERT MD             Sep 12, 2018 13:53

## 2018-10-11 NOTE — DISCHARGE SUMMARY
DATE OF ADMISSION:  September 3, 2018

DATE OF DISCHARGE:  September 12, 2018





ADMISSION DIAGNOSIS  

Weakness status post laminectomy and fusion L3 to L5.



SUMMARY OF CARE

Mr. Farr is a 78-year-old gentleman who underwent laminectomy and fusion in 

the lumbar spine and postoperatively needed more time for recovery secondary to 

postoperative weakness and difficulty with mobilization.  He was, therefore, 

admitted to the extended care facility for further acute rehabilitation.  He did

well through the course of his stay and was ultimately discharged home on the 

12th of September in good condition.  

[facility]

## 2018-11-07 ENCOUNTER — HOSPITAL ENCOUNTER (EMERGENCY)
Dept: HOSPITAL 89 - ER | Age: 78
Discharge: HOME | End: 2018-11-07
Payer: MEDICARE

## 2018-11-07 VITALS — DIASTOLIC BLOOD PRESSURE: 83 MMHG | SYSTOLIC BLOOD PRESSURE: 121 MMHG

## 2018-11-07 VITALS — BODY MASS INDEX: 35.08 KG/M2 | WEIGHT: 225.19 LBS
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
## 2018-11-07 DIAGNOSIS — S40.011A: ICD-10-CM

## 2018-11-07 DIAGNOSIS — S01.511A: Primary | ICD-10-CM

## 2018-11-07 DIAGNOSIS — S09.90XA: ICD-10-CM
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
## 2018-11-07 PROCEDURE — 99283 EMERGENCY DEPT VISIT LOW MDM: CPT

## 2018-11-07 NOTE — ER REPORT
History and Physical


Time Seen By MD:  22:39


Hx. of Stated Complaint:  


FELL, HIT FACE ON COUNTER, SPLIT LOWER LIP OPEN


HPI/ROS


CHIEF COMPLAINT: Fall, lip laceration





HISTORY OF PRESENT ILLNESS: 78-year-old male on Xarelto fell striking his face. 


Patient denies neck pain, or LOC.  Patient denies any other injuries.  He denies


headache, nausea or vomiting.  Patient thinks his tetanus status is up-to-date. 


There is a tiny laceration to the right lower lip through the vermilion border. 


Patient denies syncope.  He fell into his kitchen counter, sustaining a la


ceration to his lip.





REVIEW OF SYSTEMS:


Respiratory: No cough, no dyspnea.


Cardiovascular: No chest pain, no palpitations.


Gastrointestinal: No vomiting, no abdominal pain.


Musculoskeletal: No back pain.


Allergies:  


Coded Allergies:  


     morphine (Unverified  Allergy, Intermediate, TURNS RED, 11/7/18)


   


   "felt like run over by truck"


Uncoded Allergies:  


     HAYFEVER (Allergy, Unknown, 5/27/14)


Home Meds


Active Scripts


Diclofenac Sodium 1% Gel (VOLTAREN 1% GEL) 100 Gm Gel..gram., 2 GM TOP BID for 


30 Days, #1 TUBE


   Prov:SPIKE ROSA MD         10/2/18


Rivaroxaban 20 Mg (XARELTO 20 MG) 20 Mg Tablet, 1 TAB PO DAILY, #30 TAB 0 


Refills


   Prov:SOLEDAD HANNA MD         10/1/15


Reported Medications


Triamcinolone Acetonide (Nasacort) 10.8 Ml Spray, 2 SPRAYS NS DAILY PRN for 


ALLERGY SYMPTOMS


   9/12/18


Calcium Citrate (CALCIUM CITRATE) 250 Mg Tablet, 250 MG PO DAILY


   9/12/18


Cyanocobalamin (Vitamin B-12) (VITAMIN B-12) 1,000 Mcg Tab.subl, 1000 MCG SL 


DAILY


   9/12/18


Losartan Potassium (LOSARTAN POTASSIUM) 50 Mg Tablet, 1 TAB PO DAILY


   8/29/18


Melatonin (MELATONIN) 5 Mg Tablet, 10 MG PO HS


   8/29/18


Fluticasone/Salmeterol (ADVAIR -21 MCG INHALER) 12 Gm Hfa.aer.ad, 2 PUFF 


IH HS


   8/23/18


Cetirizine Hcl (CETIRIZINE HCL) 10 Mg Tablet, 10 MG PO QDAY, TAB


   6/13/18


Vit C/E/Zn/Coppr/Lutein/Zeaxan (Preservision Areds 2 Softgel) 1 Each Capsule, 1 


CAP BID


   6/13/18


Cholecalciferol (Vitamin D3) (VITAMIN D3) 2,000 Unit Capsule, 1 CAP PO DAILY, 


CAPSULE


   2/12/18


Vitamin E Acetate (VITAMIN E) 1,000 Unit Capsule, 1 CAP PO DAILY, CAPSULE


   2/5/18


Lutein (LUTEIN) 20 Mg Capsule, 1 CAP PO DAILY, CAPSULE


   2/5/18


Gluc 2KCL/Chondr/Caryl Hy/Hy Ac (GLUCOSAMINE & CHONDROITIN CAP) 1 Each Capsule, 1


CAP PO DAILY, CAPSULE


   2/5/18


Multivit-Min/FA/Lycopen/Lutein (Centrum Silver Men Tablet) 300 Mcg-600 Mcg-300 


Mcg Tablet, 1 TAB PO DAILY


   pt states this is chewable form


   2/5/18


Fluticasone Prop 50 Mcg Ns (FLONASE 50 MCG NS) 16 Gm Spray.susp, 2 SPRAYS NS 


PRN, BOT


   2/5/18


Albuterol Sulfate (VENTOLIN HFA) 18 Gm Inh, 1-2 PUFF INH QID PRN for 30 Days, 


INH


   8/7/14


Montelukast Sodium (SINGULAIR) 10 Mg Tablet, 1 TAB PO HS


   5/27/14


Ascorbate Calcium (Vitamin C) 500 Mg Tablet, 1 TAB PO DAILY


   2/18/13


Past Medical/Surgical History


Past Medical History Reviewed:  Yes








HEENT:   Reports hx of: allergic rhinitis (Comes & goes, never allergies shots)














Cardiovascular:   Reports hx of: atrial fibrillation (Spring 2014, nuclear 


stress test small area of inferior reversible ischemia)





  hypertension (Tx since 70's, recent low potassium, spirono started 6/14)














Respiratory:   Reports hx of: asthma (Started in his 50's. No hospitalizations, 


Peak flow 400 6/14)





  pneumonia (Never hospitalized)





  sleep apnea (Dx about 2004 and uses bipap, works well, no O2 needed)














Gastrointestinal:   Reports hx of: GERD (before gastric bypass. )





  GI bleed (6/13. Had EGD and colonoscopy. )














Musculoskeletal:   Reports hx of: gout (in knee then left big toe. was on 


allopurinol for years. )














Integumentary:   Reports hx of: other integumentary hx (dermatitis, whole body, 


Hx of yeast infection in groin area)








Hematology/oncology:  Reports hx of: skin cancer (right arm)








Infectious disease:   Reports hx of: tuberculosis (1967 infected in lab, some 


nodules on CXR, INH x 18 mo's)














Events:   REPORTS HX OF: Other events (abstracted 12/14. Annual in 6/14. )








10/09/2017 periprosthetic supracondylar Femus fracture with locking plate and 


screws








Gastrointestinal:   Reports hx of: cholecystectomy (done at time of gastric 


bypass. )





  gastric bypass (2004 in [place]. lost 90#)





  other GI surgery (hemorrhoid surgery x 2, Upper and lower GI scope 2013)














Genitourinary:   Reports hx of: vasectomy (1970s)














Musculoskeletal:   Reports hx of: arthroscopy (Right knee 1990)





  carpal tunnel release (left carpal tunnel about 2000. )





  fracture repair (Periprostheric Supracondylar femur fracture 2017)





  spinal surgery (2018)





  total joint replacement (TRK 2001, TLK 2007, Right shoulder Hemiarthroplasty 


2002,Right reverse total shoulder 2015)








10/09/2017 periprosthetic supracondylar Femus fracture with locking plate and 


screws


Reviewed Nurses Notes:  Yes


Old Medical Records Reviewed:  Yes


Hx Smoking:  Yes


Smoking Status:  Former Smoker


Hx Substance Use Disorder:  No


Constitutional





Vital Sign - Last 24 Hours








 11/7/18





 22:21


 


Temp 98.1


 


Pulse 78


 


Resp 18


 


B/P (MAP) 121/83


 


Pulse Ox 93


 


O2 Delivery Room Air








Physical Exam


General Appearance: The patient is alert, has no immediate need for airway 


protection and no current signs of toxicity.  Vital signs stable, afebrile, 


pulse ox normal, alert and oriented 3.  Palpation of the head and neck reveal 


no tenderness or trauma.  There is a 1 cm lip laceration noted on the right 


lower lip


HEENT: Pupils equal and round no injection.


Respiratory: Chest is non tender, lungs are clear to auscultation.  No chest 


wall tenderness


Cardiac: regular rate and rhythm, no murmur


Gastrointestinal: Abdomen is soft and non tender, no masses, bowel sounds 


normal.


Musculoskeletal:  Neck: Neck is supple and non tender.  No tenderness on 


aggressive palpation of the midline


Extremities have full range of motion and are non tender.  No evidence of trauma


Skin: No rashes or lesions.


Neuro: Alert and oriented 3, cranial nerves II through XII intact motor 5/5 all


groups, sensory intact to light touch 4, cerebellum grossly intact





DIFFERENTIAL DIAGNOSIS: After history and physical exam differential diagnosis 


was considered for head injury including but not limited to concussion, skull 


fracture, intraparenchymal contusion, subarachnoid, subdural and epidural 


hematoma.  Additionally,fall in the elderly including but not limited to 


intracranial injury, long bone and pelvic bone fracture, spinal injury, and 


intrathoracic injury.





Medical Decision Making


EKG/Imaging


Imaging


Results:


CT scan of the head and C-spine without contrast was obtained.  The results of 


the study are patient refused to have studies performed.  He was asked to sign 


an against medical advice form.  He is on blood thinners and fell striking his 


face.  I think a CT scan is warranted since he is on Xarelto.  The study was 


read by the radiologist.  I viewed the images myself on the PACS system.





ED Course/Re-evaluation


ED Course


Patient was admitted to an examination room.  H&P was done.  The differential 


diagnoses was considered.  On clinical examination.  Patient has a lip 


laceration.  He did fall.  He is on Xarelto.  He has no headache symptoms.  He 


has no neck pain.  A CT scan of the head was recommended since he is on Xarelto 


since she had a cold bleeding.  Patient refused head and neck CT.  He was asked 


to sign any against medical advice form.  His lip laceration was repaired as 


noted below.  Advised a low threshold to return for any worsening, wound care 


was discussed.  Suture removal to be in 5 days











Procedure:  Laceration repair.





Verbal consent was obtained from the patient.  The 1.0 cm laceration on the 


right lower lip through the vermilion border was anesthetized in the usual 


fashion. The wound was scrubbed, draped and explored to its base with a gloved 


finger.  There were no deep structures involved.  The wound was repaired with 


ibuprofen 3 sutures.  The wound repair was simple.  The procedure was performed


by myself.


Decision to Disposition Date:  Nov 7, 2018


Decision to Disposition Time:  23:42





Depart


Departure


Latest Vital Signs





Vital Signs








  Date Time  Temp Pulse Resp B/P (MAP) Pulse Ox O2 Delivery O2 Flow Rate FiO2


 


11/7/18 22:21 98.1 78 18 121/83 93 Room Air  








Impression:  


   Primary Impression:  


   Fall


   Additional Impressions:  


   Lip laceration


   Head injury


   Contusion of right shoulder


Condition:  Improved


Disposition:  HOME OR SELF-CARE


Referrals:  


SPIKE ROSA MD (PCP)


Patient Instructions:  Contusion in Adults (ED), Facial Laceration (ED), Head 


Injury (ED)





Additional Instructions:  


Return to the ER for any worsening, especially headache or mental status changes


Perform daily wound care, gently cleanse the area with peroxide and water mixed 


50-50 with gauze or Q-tips.  Apply a thin layer antibiotic ointment


Have your stitches removed in 5 days


follow up with your primary care





Problem Qualifiers








   Primary Impression:  


   Fall


   Encounter type:  initial encounter  Qualified Codes:  W19.XXXA - Unspecified 


   fall, initial encounter


   Additional Impressions:  


   Lip laceration


   Encounter type:  initial encounter  Qualified Codes:  S01.511A - Laceration 


   without foreign body of lip, initial encounter


   Head injury


   Encounter type:  initial encounter  Qualified Codes:  S09.90XA - Unspecified 


   injury of head, initial encounter


   Contusion of right shoulder


   Encounter type:  initial encounter  Qualified Codes:  S40.011A - Contusion of


   right shoulder, initial encounter








KAT LIN DO               Nov 7, 2018 22:39

## 2018-11-18 ENCOUNTER — HOSPITAL ENCOUNTER (INPATIENT)
Dept: HOSPITAL 89 - ER | Age: 78
LOS: 4 days | Discharge: HOME | DRG: 390 | End: 2018-11-22
Attending: SURGERY | Admitting: SURGERY
Payer: MEDICARE

## 2018-11-18 VITALS
BODY MASS INDEX: 36.57 KG/M2 | WEIGHT: 233 LBS | BODY MASS INDEX: 36.57 KG/M2 | WEIGHT: 233 LBS | HEIGHT: 67 IN | HEIGHT: 67 IN

## 2018-11-18 DIAGNOSIS — Z98.84: ICD-10-CM

## 2018-11-18 DIAGNOSIS — Z90.49: ICD-10-CM

## 2018-11-18 DIAGNOSIS — K56.609: Primary | ICD-10-CM

## 2018-11-18 DIAGNOSIS — G47.33: ICD-10-CM

## 2018-11-18 DIAGNOSIS — J45.909: ICD-10-CM

## 2018-11-18 DIAGNOSIS — I10: ICD-10-CM

## 2018-11-18 DIAGNOSIS — Z96.653: ICD-10-CM

## 2018-11-18 DIAGNOSIS — Z88.5: ICD-10-CM

## 2018-11-18 DIAGNOSIS — I48.2: ICD-10-CM

## 2018-11-18 PROCEDURE — 84484 ASSAY OF TROPONIN QUANT: CPT

## 2018-11-18 PROCEDURE — 82150 ASSAY OF AMYLASE: CPT

## 2018-11-18 PROCEDURE — 84132 ASSAY OF SERUM POTASSIUM: CPT

## 2018-11-18 PROCEDURE — 71045 X-RAY EXAM CHEST 1 VIEW: CPT

## 2018-11-18 PROCEDURE — 84450 TRANSFERASE (AST) (SGOT): CPT

## 2018-11-18 PROCEDURE — 96376 TX/PRO/DX INJ SAME DRUG ADON: CPT

## 2018-11-18 PROCEDURE — 36415 COLL VENOUS BLD VENIPUNCTURE: CPT

## 2018-11-18 PROCEDURE — 82247 BILIRUBIN TOTAL: CPT

## 2018-11-18 PROCEDURE — 85025 COMPLETE CBC W/AUTO DIFF WBC: CPT

## 2018-11-18 PROCEDURE — 74250 X-RAY XM SM INT 1CNTRST STD: CPT

## 2018-11-18 PROCEDURE — 84460 ALANINE AMINO (ALT) (SGPT): CPT

## 2018-11-18 PROCEDURE — 99285 EMERGENCY DEPT VISIT HI MDM: CPT

## 2018-11-18 PROCEDURE — 82310 ASSAY OF CALCIUM: CPT

## 2018-11-18 PROCEDURE — 81001 URINALYSIS AUTO W/SCOPE: CPT

## 2018-11-18 PROCEDURE — 82565 ASSAY OF CREATININE: CPT

## 2018-11-18 PROCEDURE — 74177 CT ABD & PELVIS W/CONTRAST: CPT

## 2018-11-18 PROCEDURE — 82040 ASSAY OF SERUM ALBUMIN: CPT

## 2018-11-18 PROCEDURE — 93005 ELECTROCARDIOGRAM TRACING: CPT

## 2018-11-18 PROCEDURE — 82435 ASSAY OF BLOOD CHLORIDE: CPT

## 2018-11-18 PROCEDURE — 84520 ASSAY OF UREA NITROGEN: CPT

## 2018-11-18 PROCEDURE — 96374 THER/PROPH/DIAG INJ IV PUSH: CPT

## 2018-11-18 PROCEDURE — 74018 RADEX ABDOMEN 1 VIEW: CPT

## 2018-11-18 PROCEDURE — 82374 ASSAY BLOOD CARBON DIOXIDE: CPT

## 2018-11-18 PROCEDURE — 84075 ASSAY ALKALINE PHOSPHATASE: CPT

## 2018-11-18 PROCEDURE — 83690 ASSAY OF LIPASE: CPT

## 2018-11-18 PROCEDURE — 82947 ASSAY GLUCOSE BLOOD QUANT: CPT

## 2018-11-18 PROCEDURE — 84295 ASSAY OF SERUM SODIUM: CPT

## 2018-11-18 PROCEDURE — 96361 HYDRATE IV INFUSION ADD-ON: CPT

## 2018-11-18 PROCEDURE — 84155 ASSAY OF PROTEIN SERUM: CPT

## 2018-11-18 PROCEDURE — 83605 ASSAY OF LACTIC ACID: CPT

## 2018-11-18 PROCEDURE — 96375 TX/PRO/DX INJ NEW DRUG ADDON: CPT

## 2018-11-18 NOTE — EKG
[place] [facility] [place] 

 

PATIENT NAME: SHAYNE ANDUJAR

: [id number]

MR: [id number]

V: [id number]

EXAM DATE: 

ORDERING PHYSICIAN: KAT LIN

TECHNOLOGIST: RACHEL

 

Test Reason : ABDOMINAL PAIN

Blood Pressure : ***/*** mmHG

Vent. Rate : 066 BPM     Atrial Rate : 077 BPM

   P-R Int : 000 ms          QRS Dur : 096 ms

    QT Int : 428 ms       P-R-T Axes : 000 -61 011 degrees

   QTc Int : 448 ms

 

Atrial fibrillation

Left axis deviation

Nonspecific ST abnormality

Abnormal ECG

When compared with ECG of 23-AUG-2018 14:56,

No significant change was found

Confirmed by ENDY HOLGUIN ([id number]) on 2018 6:15:06 AM

 

Referred By:             Confirmed By:ENDY HOLGUIN

## 2018-11-18 NOTE — ER REPORT
History and Physical


Time Seen By MD:  23:28


HPI/ROS


CHIEF COMPLAINT: Upper quadrant abdominal pain





HISTORY OF PRESENT ILLNESS: 78-year-old male presents ambulatory to the ER 


complaining of left upper quadrant abdominal pain for one hour.  He notes some 


mild nausea.  He describes 8/10 pain without radiation.  He notes no 


exacerbating or alleviating factors.  He's had no fever, no chills.  He's had no


vomiting or diarrhea.  He is status post gastric bypass.  He notes no dysuria or


hematuria.  He notes no trouble with his urine.  Patient has a history of atrial


fibrillation and takes Xarelto.





REVIEW OF SYSTEMS:


Respiratory: No cough, no dyspnea.


Cardiovascular: No chest pain, no palpitations.


Gastrointestinal: As above


Musculoskeletal: No back pain.


Allergies:  


Coded Allergies:  


     morphine (Unverified  Allergy, Intermediate, TURNS RED, 18)


   


   "felt like run over by truck"


Uncoded Allergies:  


     HAYFEVER (Allergy, Unknown, 14)


Home Meds


Active Scripts


Montelukast Sodium (SINGULAIR) 10 Mg Tablet, 1 TAB PO HS for 90 Days, #90 TAB 4 


Refills


   Prov:SPIKE ROSA MD         11/15/18


Diclofenac Sodium 1% Gel (VOLTAREN 1% GEL) 100 Gm Gel..gram., 2 GM TOP BID for 


30 Days, #1 TUBE


   Prov:SPIKE ROSA MD         10/2/18


Rivaroxaban 20 Mg (XARELTO 20 MG) 20 Mg Tablet, 1 TAB PO DAILY, #30 TAB 0 


Refills


   Prov:SOLEDAD HANNA MD         10/1/15


Reported Medications


Triamcinolone Acetonide (Nasacort) 10.8 Ml Spray, 2 SPRAYS NS DAILY PRN for 


ALLERGY SYMPTOMS


   18


Calcium Citrate (CALCIUM CITRATE) 250 Mg Tablet, 250 MG PO DAILY


   18


Cyanocobalamin (Vitamin B-12) (VITAMIN B-12) 1,000 Mcg Tab.subl, 1000 MCG SL 


DAILY


   18


Losartan Potassium (LOSARTAN POTASSIUM) 50 Mg Tablet, 1 TAB PO DAILY


   18


Melatonin (MELATONIN) 5 Mg Tablet, 10 MG PO HS


   18


Fluticasone/Salmeterol (ADVAIR -21 MCG INHALER) 12 Gm Hfa.aer.ad, 2 PUFF 


IH HS


   18


Cetirizine Hcl (CETIRIZINE HCL) 10 Mg Tablet, 10 MG PO QDAY, TAB


   18


Vit C/E/Zn/Coppr/Lutein/Zeaxan (Preservision Areds 2 Softgel) 1 Each Capsule, 1 


CAP BID


   18


Cholecalciferol (Vitamin D3) (VITAMIN D3) 2,000 Unit Capsule, 1 CAP PO DAILY, 


CAPSULE


   18


Vitamin E Acetate (VITAMIN E) 1,000 Unit Capsule, 1 CAP PO DAILY, CAPSULE


   18


Lutein (LUTEIN) 20 Mg Capsule, 1 CAP PO DAILY, CAPSULE


   18


Gluc 2KCL/Chondr/Caryl Hy/Hy Ac (GLUCOSAMINE & CHONDROITIN CAP) 1 Each Capsule, 1


CAP PO DAILY, CAPSULE


   18


Multivit-Min/FA/Lycopen/Lutein (Centrum Silver Men Tablet) 300 Mcg-600 Mcg-300 


Mcg Tablet, 1 TAB PO DAILY


   pt states this is chewable form


   18


Fluticasone Prop 50 Mcg Ns (FLONASE 50 MCG NS) 16 Gm Spray.susp, 2 SPRAYS NS 


PRN, BOT


   18


Albuterol Sulfate (VENTOLIN HFA) 18 Gm Inh, 1-2 PUFF INH QID PRN for 30 Days, 


INH


   14


Ascorbate Calcium (Vitamin C) 500 Mg Tablet, 1 TAB PO DAILY


   13


Past Medical/Surgical History


Past Medical History Reviewed:  Yes








HEENT:   Reports hx of: allergic rhinitis (Comes & goes, never allergies shots)














Cardiovascular:   Reports hx of: atrial fibrillation (Spring 2014, nuclear 


stress test small area of inferior reversible ischemia)





  hypertension (Tx since 70's, recent low potassium, spirono started )














Respiratory:   Reports hx of: asthma (Started in his 50's. No hospitalizations, 


Peak flow 400 )





  pneumonia (Never hospitalized)





  sleep apnea (Dx about  and uses bipap, works well, no O2 needed)














Gastrointestinal:   Reports hx of: GERD (before gastric bypass. )





  GI bleed (. Had EGD and colonoscopy. )














Musculoskeletal:   Reports hx of: gout (in knee then left big toe. was on 


allopurinol for years. )














Integumentary:   Reports hx of: other integumentary hx (dermatitis, whole body, 


Hx of yeast infection in groin area)








Hematology/oncology:  Reports hx of: skin cancer (right arm)








Infectious disease:   Reports hx of: tuberculosis (1967 infected in lab, some 


nodules on CXR, INH x 18 mo's)














Events:   REPORTS HX OF: Other events (abstracted . Annual in . )








10/09/2017 periprosthetic supracondylar Femus fracture with locking plate and 


screws








Gastrointestinal:   Reports hx of: cholecystectomy (done at time of gastric 


bypass. )





  gastric bypass (2004 in [place]. lost 90#)





  other GI surgery (hemorrhoid surgery x 2, Upper and lower GI scope 2013)














Genitourinary:   Reports hx of: vasectomy (1970s)














Musculoskeletal:   Reports hx of: arthroscopy (Right knee )





  carpal tunnel release (left carpal tunnel about . )





  fracture repair (Periprostheric Supracondylar femur fracture )





  spinal surgery ()





  total joint replacement (TRK , TLK , Right shoulder Hemiarthroplasty 


,Right reverse total shoulder )








10/09/2017 periprosthetic supracondylar Femus fracture with locking plate and 


screws


Reviewed Nurses Notes:  Yes


Old Medical Records Reviewed:  Yes


Hx Smoking:  Yes


Smoking Status:  Former Smoker


Hx Substance Use Disorder:  No


Constitutional





Vital Sign - Last 24 Hours








 18





 23:26 23:27 23:31 23:32


 


Temp  97.9  


 


Pulse  79 81 


 


Resp  24  


 


B/P (MAP) 179/105 (129) 179/105  154/106 (122)


 


Pulse Ox  90 92 


 


O2 Delivery  Room Air  


 


    





 18





 23:46 00:00 00:05 00:06


 


Pulse 75  66 


 


B/P (MAP)  141/99 (113)  


 


Pulse Ox 88  91 


 


O2 Flow Rate    2.0





 18





 00:20 00:30 00:50 01:14


 


Pulse 74  80 


 


B/P (MAP)  152/97 (115)  151/102 (118)


 


Pulse Ox 93  94 





 18





 01:20 01:30 01:35 01:50


 


Pulse 68  64 


 


B/P (MAP)  159/103 (121)  


 


Pulse Ox 95  97 96





 18





 02:00 02:05 02:20 02:30


 


Pulse  69 70 


 


B/P (MAP) 167/117 (134)   160/90 (113)


 


Pulse Ox  96 96 





 18   





 02:35   


 


Pulse 74   


 


Pulse Ox 94   














Intake and Output   


 


 18





 15:00 23:00 07:00


 


Intake Total   1000 ml


 


Balance   1000 ml








Physical Exam


  Vital signs stable, afebrile, pulse ox normal


General Appearance: The patient is alert, has no immediate need for airway 


protection and no current signs of toxicity.  Slightly pale appearing, skin warm


and dry


HEENT: Pupils equal and round no injection.  Oropharynx with moist membranes, no


erythema


Respiratory: Chest is non tender, lungs are clear to auscultation.


Cardiac: regular rate and rhythm


Gastrointestinal: Abdomen is distended and firm, mild tenderness in the left 


upper quadrant and non tender, no masses, bowel sounds normal.


Musculoskeletal:  Neck: Neck is supple and non tender.  No JVD


Extremities have full range of motion and are non tender.  No extremity edema


Skin: No rashes or lesions.


[ ]


DIFFERENTIAL DIAGNOSIS: After history and physical exam differential diagnosis 


was considered for abdominal pain including but not limited to appendicitis, 


cholecystitis, gastritis, bowel obstruction, food poisoning, gastroenteritis and


urinary tract infection.





Medical Decision Making


Data Points


Result Diagram:  


18 [id number]                                                                   


            18 [id number]





Laboratory





Hematology








Test


 18


23:55 18


01:40


 


Red Blood Count


 4.90 M/uL


(4.00-5.60) 





 


Mean Corpuscular Volume


 75.2 fL


(80.0-96.0) 





 


Mean Corpuscular Hemoglobin


 23.3 pg


(26.0-33.0) 





 


Mean Corpuscular Hemoglobin


Concent 31.0 g/dL


(32.0-36.0) 





 


Red Cell Distribution Width


 16.1 %


(11.5-14.5) 





 


Mean Platelet Volume


 8.5 fL


(7.2-11.1) 





 


Neutrophils (%) (Auto)


 82.8 %


(39.4-72.5) 





 


Lymphocytes (%) (Auto)


 10.0 %


(17.6-49.6) 





 


Monocytes (%) (Auto)


 4.6 %


(4.1-12.4) 





 


Eosinophils (%) (Auto)


 2.1 %


(0.4-6.7) 





 


Basophils (%) (Auto)


 0.5 %


(0.3-1.4) 





 


Nucleated RBC Relative Count


(auto) 0.0 /100WBC 


 





 


Neutrophils # (Auto)


 10.9 K/uL


(2.0-7.4) 





 


Lymphocytes # (Auto)


 1.3 K/uL


(1.3-3.6) 





 


Monocytes # (Auto)


 0.6 K/uL


(0.3-1.0) 





 


Eosinophils # (Auto)


 0.3 K/uL


(0.0-0.5) 





 


Basophils # (Auto)


 0.1 K/uL


(0.0-0.1) 





 


Nucleated RBC Absolute Count


(auto) 0.00 K/uL 


 





 


Sodium Level


 142 mmol/L


(137-145) 





 


Potassium Level


 3.6 mmol/L


(3.5-5.0) 





 


Chloride Level


 109 mmol/L


() 





 


Carbon Dioxide Level


 20 mmol/L


(22-30) 





 


Blood Urea Nitrogen


 23 mg/dl


(9-21) 





 


Creatinine


 1.10 mg/dl


(0.66-1.25) 





 


Glomerular Filtration Rate


Calc > 60.0 


 





 


Random Glucose


 162 mg/dl


() 





 


Lactate


 1.7 mmol/L


(0.7-2.1) 





 


Calcium Level


 9.2 mg/dl


(8.4-10.2) 





 


Total Bilirubin


 0.5 mg/dl


(0.2-1.3) 





 


Aspartate Amino Transf


(AST/SGOT) 26 U/L (0-35) 


 





 


Alanine Aminotransferase


(ALT/SGPT) 33 U/L (0-56) 


 





 


Alkaline Phosphatase


 123 U/L


(0-126) 





 


Troponin I < 0.012 ng/ml  


 


Total Protein


 6.7 g/dl


(6.3-8.2) 





 


Albumin


 3.7 g/dl


(3.5-5.0) 





 


Amylase Level 65 U/L (0-110)  


 


Lipase


 129 U/L


() 





 


Urine Color  Yellow 


 


Urine Clarity  Clear 


 


Urine pH


 


 5.0 pH


(4.8-9.5)


 


Urine Specific Gravity  1.036 


 


Urine Protein


 


 Negative mg/dL


(NEGATIVE)


 


Urine Glucose (UA)


 


 Negative mg/dL


(NEGATIVE)


 


Urine Ketones


 


 20 mg/dL


(NEGATIVE)


 


Urine Blood


 


 Negative


(NEGATIVE)


 


Urine Nitrite


 


 Negative


(NEGATIVE)


 


Urine Bilirubin


 


 Negative


(NEGATIVE)


 


Urine Urobilinogen


 


 Negative mg/dL


(0.2-1.9)


 


Urine Leukocyte Esterase


 


 Negative


(NEGATIVE)


 


Urine RBC


 


 <1 /HPF


(0-2/HPF)


 


Urine WBC


 


 <1 /HPF


(0-5/HPF)


 


Urine Squamous Epithelial


Cells 


 Few /LPF


(</=FEW)


 


Urine Bacteria


 


 Negative /HPF


(NONE-FEW)


 


Urine Mucus


 


 None /HPF


(NONE-FEW)








Chemistry








Test


 18


23:55 18


01:40


 


White Blood Count


 13.1 k/uL


(4.5-11.0) 





 


Red Blood Count


 4.90 M/uL


(4.00-5.60) 





 


Hemoglobin


 11.4 g/dL


(14.0-18.0) 





 


Hematocrit


 36.8 %


(42.0-52.0) 





 


Mean Corpuscular Volume


 75.2 fL


(80.0-96.0) 





 


Mean Corpuscular Hemoglobin


 23.3 pg


(26.0-33.0) 





 


Mean Corpuscular Hemoglobin


Concent 31.0 g/dL


(32.0-36.0) 





 


Red Cell Distribution Width


 16.1 %


(11.5-14.5) 





 


Platelet Count


 262 K/uL


(150-450) 





 


Mean Platelet Volume


 8.5 fL


(7.2-11.1) 





 


Neutrophils (%) (Auto)


 82.8 %


(39.4-72.5) 





 


Lymphocytes (%) (Auto)


 10.0 %


(17.6-49.6) 





 


Monocytes (%) (Auto)


 4.6 %


(4.1-12.4) 





 


Eosinophils (%) (Auto)


 2.1 %


(0.4-6.7) 





 


Basophils (%) (Auto)


 0.5 %


(0.3-1.4) 





 


Nucleated RBC Relative Count


(auto) 0.0 /100WBC 


 





 


Neutrophils # (Auto)


 10.9 K/uL


(2.0-7.4) 





 


Lymphocytes # (Auto)


 1.3 K/uL


(1.3-3.6) 





 


Monocytes # (Auto)


 0.6 K/uL


(0.3-1.0) 





 


Eosinophils # (Auto)


 0.3 K/uL


(0.0-0.5) 





 


Basophils # (Auto)


 0.1 K/uL


(0.0-0.1) 





 


Nucleated RBC Absolute Count


(auto) 0.00 K/uL 


 





 


Glomerular Filtration Rate


Calc > 60.0 


 





 


Lactate


 1.7 mmol/L


(0.7-2.1) 





 


Calcium Level


 9.2 mg/dl


(8.4-10.2) 





 


Total Bilirubin


 0.5 mg/dl


(0.2-1.3) 





 


Aspartate Amino Transf


(AST/SGOT) 26 U/L (0-35) 


 





 


Alanine Aminotransferase


(ALT/SGPT) 33 U/L (0-56) 


 





 


Alkaline Phosphatase


 123 U/L


(0-126) 





 


Troponin I < 0.012 ng/ml  


 


Total Protein


 6.7 g/dl


(6.3-8.2) 





 


Albumin


 3.7 g/dl


(3.5-5.0) 





 


Amylase Level 65 U/L (0-110)  


 


Lipase


 129 U/L


() 





 


Urine Color  Yellow 


 


Urine Clarity  Clear 


 


Urine pH


 


 5.0 pH


(4.8-9.5)


 


Urine Specific Gravity  1.036 


 


Urine Protein


 


 Negative mg/dL


(NEGATIVE)


 


Urine Glucose (UA)


 


 Negative mg/dL


(NEGATIVE)


 


Urine Ketones


 


 20 mg/dL


(NEGATIVE)


 


Urine Blood


 


 Negative


(NEGATIVE)


 


Urine Nitrite


 


 Negative


(NEGATIVE)


 


Urine Bilirubin


 


 Negative


(NEGATIVE)


 


Urine Urobilinogen


 


 Negative mg/dL


(0.2-1.9)


 


Urine Leukocyte Esterase


 


 Negative


(NEGATIVE)


 


Urine RBC


 


 <1 /HPF


(0-2/HPF)


 


Urine WBC


 


 <1 /HPF


(0-5/HPF)


 


Urine Squamous Epithelial


Cells 


 Few /LPF


(</=FEW)


 


Urine Bacteria


 


 Negative /HPF


(NONE-FEW)


 


Urine Mucus


 


 None /HPF


(NONE-FEW)








Urinalysis








Test


 18


01:40


 


Urine Color Yellow 


 


Urine Clarity Clear 


 


Urine pH


 5.0 pH


(4.8-9.5)


 


Urine Specific Gravity 1.036 


 


Urine Protein


 Negative mg/dL


(NEGATIVE)


 


Urine Glucose (UA)


 Negative mg/dL


(NEGATIVE)


 


Urine Ketones


 20 mg/dL


(NEGATIVE)


 


Urine Blood


 Negative


(NEGATIVE)


 


Urine Nitrite


 Negative


(NEGATIVE)


 


Urine Bilirubin


 Negative


(NEGATIVE)


 


Urine Urobilinogen


 Negative mg/dL


(0.2-1.9)


 


Urine Leukocyte Esterase


 Negative


(NEGATIVE)


 


Urine RBC


 <1 /HPF


(0-2/HPF)


 


Urine WBC


 <1 /HPF


(0-5/HPF)


 


Urine Squamous Epithelial


Cells Few /LPF


(</=FEW)


 


Urine Bacteria


 Negative /HPF


(NONE-FEW)


 


Urine Mucus


 None /HPF


(NONE-FEW)











EKG/Imaging


EKG Interpretation


12 lead EK


      Rhythm: Atrial fibrillation with slow ventricular rate of 66


      Axis: Left axis deviation


      QRS: normal


      ST segments: Nonspecific T wave abnormality,?  Dig effect, comparison to 


previous old EKGs dated 18, no significant morphologic change, rate was 


slightly faster at 77 bpm.


Imaging


Results:


CT scan of the abdomen and pelvis with IV contrast was obtained.  The results of


the study are 


ABDOMEN/PELVIS WITH CONTRAST


 


HISTORY: Left lower quadrant abdominal pain. Status post gastric bypass.


 


COMPARISON: CT pelvis 2014. No prior CT of the abdomen.


 


TECHNIQUE: Axial images were obtained from the lung bases through the symphysis 


pubis with intravenous contrast. Sagittal and coronal reformats were performed.


 


One of the following dose optimization techniques was utilized in the 


performance of this exam: Automated exposure control; adjustment of the mA 


and/or kV according to the patient's size; or use of an iterative reconstruction


technique. Specific details can be referenced in the facility's radiology CT 


exam operational policy.


 


CONTRAST: 75 mL IV [id number].


 


 


FINDINGS:


 


Lower chest: There is a calcified left hilar lymph nodes, compatible with prior 


granulomatous disease. There is coronary artery calcification. There is mild 


atelectasis..


 


Liver: There are more than 10 scattered lesions within the liver. The largest 


and most discrete have Hounsfield units of fluid, compatible with benign cysts. 


In the absence of known malignancy, the smaller lesions are also statistically 


likely to represent benign cysts and/or hemangiomas. None of them change on 


delayed imaging, favoring that they are cysts.


 


Gallbladder/biliary: Cholecystectomy. There is mild intrahepatic and 


extrahepatic ductal dilation, not uncommonly seen in patients who are status 


post cholecystectomy. No ductal filling defect.


 


Pancreas: Normal.


 


Spleen: There are calcified granulomas. There is 6 mm too small to characterize 


low attenuating lesion in the posterior superior spleen. In the absence of known


malignancy, it is statistically likely to represent a benign hemangioma or cyst.


It does not change on delayed imaging, favoring a cyst.


 


Adrenals: Normal.


 


Kidneys/ureters/bladder: There is a subcentimeter exophytic cyst arising from 


the left superior renal cortex. The right kidney, the ureters, and the bladder 


are normal.


 


GI/mesentery/peritoneal cavity: There are changes of gastric bypass. There is a 


small bowel obstruction. The transition is in the right lower quadrant in the 


distal ileum (axial image 71). Appreciated on axial images, but not well 


appreciated on the sagittal or coronal images, there is approximately 270 degree


swirling of the distal ileum (axial images 71 through 86 series 2). There is 


also slight swirling of the adjacent mesenteric vessels. Many of the dilated 


small bowel loops are clustered in the right side of the abdomen, and some of 


them are located anterior and right lateral to the colon and the liver. On the 


prior CT pelvis, small bowel loops were not lateral or anterior to the visible 


colon. There is a small amount of mesenteric free fluid adjacent to these bowel 


loops. No free air.


 


The appendix is normal. Within the cecum, near or at the ileocecal valve, is a 


large 6.8 cm lesion of fat attenuation.


 


Vessels: There is mild atherosclerotic disease without aneurysm. No dissection.


 


Nodes: Normal.


 


Pelvis: There is a moderate size fat-containing left inguinal hernia. There are 


pelvic phleboliths.


 


Bones/vertebra/soft tissues: There is posterior fusion hardware from L3 through 


L5. Hardware is intact. There is bony fusion across the L2-3 disc space. There 


is moderate to severe multilevel degenerative change of the spine. There is 3 mm


retrolisthesis of L5 compared to S1. There is 4 mm retrolisthesis of L1 compared


to L2. There are numerous Schmorl nodes. Posterior decompression at L3 and L4. 


There is fluid within the posterior paraspinal muscles at these levels, and 


there is dependent subcutaneous edema, all likely related to prior surgery.


 


IMPRESSION 1. Small bowel obstruction with transition in the right lower 


quadrant distal ilium. There is 270 degrees swirling of the distal ileum. While 


small bowel obstruction could be due to adhesion, given the swirling of the 


small bowel in the stomach clustered small bowel loops, possibility of internal 


hernia is raised.


2. Small amount of reactive mesenteric free fluid secondary to small bowel o


bstruction.


3. Moderate-sized fat-containing left inguinal hernia.


4. 6.8 cm lesion of fat attenuation at/near the ileocecal valve may be a lipoma.


Extensive fatty hypertrophy of the ileocecal valve is possible.











The study was read by the radiologist.  I viewed the images myself on the PACS 


system.





ED Course/Re-evaluation


Clinical Indication for ER IV:  Hydration, IV Access


ED Course


Patient was admitted to an examination room.  H&P was done.  The differential 


diagnoses was considered.  Patient with distended abdomen and pain for several 


hours.  He is here with significant discomfort.  He describes crampy pain in the


left upper quadrant.  He notes no radiation of pain anywhere else.  He's had no 


change in bowel habits.  He's had severe nausea but no vomiting.  He denies 


dysuria.  Extensive diagnostic evaluation is undertaken, which is unremarkable 


except for mildly elevated white blood cell count.  Patient continued to have 


significant discomfort and distention.  A CT of the abdomen and pelvis is 


ordered.  Since he has a previous history of a gastric bypass sign concerned 


about obstruction.  A CT scan was positive for signs of obstruction.  Results 


were discussed with general surgery on-call, Dr. Ullrich





 2018 1:43:54 am case discussed with Dr Ibanez for consultation


Decision to Disposition Date:  2018


Decision to Disposition Time:  01:43





Depart


Departure


Latest Vital Signs





Vital Signs








  Date Time  Temp Pulse Resp B/P (MAP) Pulse Ox O2 Delivery O2 Flow Rate FiO2


 


18 02:35  74   94   


 


18 02:30    160/90 (113)    


 


18 00:06       2.0 


 


18 23:27 97.9  24   Room Air  








Impression:  


   Primary Impression:  


   SBO (small bowel obstruction)


   Additional Impressions:  


   Atrial fibrillation


   HTN (hypertension)


Condition:  Improved


Disposition:  Admitted from ER


Referrals:  


SPIKE ROSA MD (PCP)





Problem Qualifiers








   Additional Impressions:  


   Atrial fibrillation


   Atrial fibrillation type:  paroxysmal  Qualified Codes:  I48.0 - Paroxysmal 


   atrial fibrillation


   HTN (hypertension)


   Hypertension type:  essential hypertension  Qualified Codes:  I10 - Essential


   (primary) hypertension








KAT LIN DO              2018 23:28

## 2018-11-19 VITALS — SYSTOLIC BLOOD PRESSURE: 131 MMHG | DIASTOLIC BLOOD PRESSURE: 85 MMHG

## 2018-11-19 VITALS — SYSTOLIC BLOOD PRESSURE: 157 MMHG | DIASTOLIC BLOOD PRESSURE: 105 MMHG

## 2018-11-19 VITALS — SYSTOLIC BLOOD PRESSURE: 131 MMHG | DIASTOLIC BLOOD PRESSURE: 86 MMHG

## 2018-11-19 VITALS — SYSTOLIC BLOOD PRESSURE: 138 MMHG | DIASTOLIC BLOOD PRESSURE: 85 MMHG

## 2018-11-19 VITALS — SYSTOLIC BLOOD PRESSURE: 167 MMHG | DIASTOLIC BLOOD PRESSURE: 101 MMHG

## 2018-11-19 VITALS — SYSTOLIC BLOOD PRESSURE: 152 MMHG | DIASTOLIC BLOOD PRESSURE: 92 MMHG

## 2018-11-19 LAB — PLATELET COUNT, AUTOMATED: 262 K/UL (ref 150–450)

## 2018-11-19 PROCEDURE — 5A09357 ASSISTANCE WITH RESPIRATORY VENTILATION, LESS THAN 24 CONSECUTIVE HOURS, CONTINUOUS POSITIVE AIRWAY PRESSURE: ICD-10-PCS | Performed by: SURGERY

## 2018-11-19 PROCEDURE — 0D9670Z DRAINAGE OF STOMACH WITH DRAINAGE DEVICE, VIA NATURAL OR ARTIFICIAL OPENING: ICD-10-PCS | Performed by: SURGERY

## 2018-11-19 RX ADMIN — HYDROMORPHONE HYDROCHLORIDE PRN MG: 2 INJECTION, SOLUTION INTRAMUSCULAR; INTRAVENOUS; SUBCUTANEOUS at 07:46

## 2018-11-19 RX ADMIN — HYDROMORPHONE HYDROCHLORIDE PRN MG: 2 INJECTION, SOLUTION INTRAMUSCULAR; INTRAVENOUS; SUBCUTANEOUS at 03:42

## 2018-11-19 RX ADMIN — ONDANSETRON HYDROCHLORIDE PRN MG: 2 INJECTION, SOLUTION INTRAMUSCULAR; INTRAVENOUS at 17:04

## 2018-11-19 RX ADMIN — ENOXAPARIN SODIUM SCH MG: 100 INJECTION SUBCUTANEOUS at 08:51

## 2018-11-19 RX ADMIN — ONDANSETRON HYDROCHLORIDE PRN MG: 2 INJECTION, SOLUTION INTRAMUSCULAR; INTRAVENOUS at 20:44

## 2018-11-19 RX ADMIN — LOSARTAN POTASSIUM SCH MG: 50 TABLET, FILM COATED ORAL at 08:52

## 2018-11-19 RX ADMIN — PANTOPRAZOLE SODIUM SCH MG: 40 INJECTION, POWDER, FOR SOLUTION INTRAVENOUS at 08:51

## 2018-11-19 RX ADMIN — ONDANSETRON HYDROCHLORIDE PRN MG: 2 INJECTION, SOLUTION INTRAMUSCULAR; INTRAVENOUS at 03:42

## 2018-11-19 RX ADMIN — Medication SCH MG: at 20:43

## 2018-11-19 RX ADMIN — MONTELUKAST SODIUM SCH MG: 10 TABLET, FILM COATED ORAL at 21:00

## 2018-11-19 RX ADMIN — THIAMINE HYDROCHLORIDE PRN MLS/HR: 100 INJECTION, SOLUTION INTRAMUSCULAR; INTRAVENOUS at 12:18

## 2018-11-19 RX ADMIN — THIAMINE HYDROCHLORIDE PRN MLS/HR: 100 INJECTION, SOLUTION INTRAMUSCULAR; INTRAVENOUS at 03:50

## 2018-11-19 RX ADMIN — HYDROMORPHONE HYDROCHLORIDE PRN MG: 2 INJECTION, SOLUTION INTRAMUSCULAR; INTRAVENOUS; SUBCUTANEOUS at 11:35

## 2018-11-19 RX ADMIN — THIAMINE HYDROCHLORIDE PRN MLS/HR: 100 INJECTION, SOLUTION INTRAMUSCULAR; INTRAVENOUS at 20:44

## 2018-11-19 NOTE — MEDICAL NUTRITION THERAPY
Nutrition Anthropometrics


Height (Inches):  67.00


Height (Calculated Centimeters:  170.732074


Weight (Pounds):  233


Weight (Calculated Kilograms):  105.687


BMI:  36.5


Hunter Nutrition Score:         Adequate 


Hunter Nutrition Risk Score:  20


Dietary Referral


Nutrition Risk Factors:      


Nutrition Risk Comment:  Small bowel obstruction.





Physical Findings


Physical Appearance:  Obese BMI 30-39


Skin Appearance


Skin Appearance:


Edema


Edema Location Modifier:  


Edema Location:               


Type of Edema:                 


Degree of Edema:


Gastrointestinal Symptoms


GI Symtoms:                Bloating 


Tube Present:               


Bowel Sounds:              


Recent Bowel Pattern:    


Stool Characteristics:





Nutritional Diagnosis


Nutritional Risk Acuity 1:  GI Obstruction


Past Medical History:  


Hx of Afib, HTN, COPD, pneumonia, Obstructive Sleep Apena, and intrinsic


asthma, gastric bypass.


Nutritional Acuity:  1-High


Nutrition Diagnosis:  Altered GI Function


Nutrition Etiology:  Physiological Causes


Nutrition Problem/Etiology/Sym:  


Altered GI function r/t physiological causes AEB obstruction, no flatus or


bowel movements.


Energy Requirement:  1590 ([place] adj for obesity * 1.2)


Protein Requirement:  95 (.9g/kg)


Fluid Requirement:  2100 (20ml/kg)


Diet Type:  NPO/Meds Only


Nutrition Intervention:  Incr diet as tolerated





Nutrition Monitoring & Eval


Nutrition Goals:  Eat % Meal, Drink > 1500 cc/day


RD Patient Assessment Time:  30 minutes


RD Assessment Type:  RD Assessment


Patient Nutrition Acuity:  1-High


Follow Up Date:  Nov 21, 2018


Nutritional Comment:  


11/19 Pt admitted with SBO. Pt is NPO/Meds only. Hx of A-fib, HTN, COPD,


pneumonia, Obstructive Sleep Apena, intrinsic asthma, and gastric bypass


(2004). Pt's pain is better this morning, but still has bloating, no


flatus or bowel movement since admission. Pt has elevated glucose at 162


and BUN at 23. Pt has 3+ edema, anticipate weight loss. Will follow. CASS BENITEZ                    Nov 19, 2018 09:26

## 2018-11-19 NOTE — RADIOLOGY IMAGING REPORT
[place] [facility] [place] 

 

PATIENT NAME: Randall Farr

: 1940

MR: [id number]

V: [id number]

EXAM DATE: 

ORDERING PHYSICIAN: KAT LIN

TECHNOLOGIST: 

 

Location: [facility] [place]

Patient: Randall Farr

: 1940

MRN: [id number]

Visit/Account:[id number]

Date of Sevice: 2018

 

ACCESSION #: [id number]

 

******** ADDENDUM #1 ********

 

Due to voice recognition error, in third sentence of impression 1 the words "in the stomach" should b
e corrected to "and the somewhat".

 

Report Dictated By: Tamar Alston at 2018 5:59 AM

 

Report E-Signed By: Tamar Alston  at 2018 6:02 AM

 

******** ORIGINAL REPORT ********

 

ABDOMEN/PELVIS WITH CONTRAST

 

HISTORY: Left lower quadrant abdominal pain. Status post gastric bypass.

 

COMPARISON: CT pelvis 2014. No prior CT of the abdomen.

 

TECHNIQUE: Axial images were obtained from the lung bases through the symphysis pubis with intravenou
s contrast. Sagittal and coronal reformats were performed.

 

One of the following dose optimization techniques was utilized in the performance of this exam: Autom
ated exposure control; adjustment of the mA and/or kV according to the patient's size; or use of an i
terative reconstruction technique. Specific details can be referenced in the facility's radiology CT 
exam operational policy.

 

CONTRAST: 75 mL IV Isovue-370.

 

 

FINDINGS:

 

Lower chest: There is a calcified left hilar lymph nodes, compatible with prior granulomatous disease
. There is coronary artery calcification. There is mild atelectasis..

 

Liver: There are more than 10 scattered lesions within the liver. The largest and most discrete have 
Hounsfield units of fluid, compatible with benign cysts. In the absence of known malignancy, the smal
ler lesions are also statistically likely to represent benign cysts and/or hemangiomas. None of them 
change on delayed imaging, favoring that they are cysts.

 

Gallbladder/biliary: Cholecystectomy. There is mild intrahepatic and extrahepatic ductal dilation, no
t uncommonly seen in patients who are status post cholecystectomy. No ductal filling defect.

 

Pancreas: Normal.

 

Spleen: There are calcified granulomas. There is 6 mm too small to characterize low attenuating lesio
n in the posterior superior spleen. In the absence of known malignancy, it is statistically likely to
 represent a benign hemangioma or cyst. It does not change on delayed imaging, favoring a cyst.

 

Adrenals: Normal.

 

Kidneys/ureters/bladder: There is a subcentimeter exophytic cyst arising from the left superior renal
 cortex. The right kidney, the ureters, and the bladder are normal.

 

GI/mesentery/peritoneal cavity: There are changes of gastric bypass. There is a small bowel obstructi
on. The transition is in the right lower quadrant in the distal ileum (axial image 71). Appreciated o
n axial images, but not well appreciated on the sagittal or coronal images, there is approximately 27
0 degree swirling of the distal ileum (axial images 71 through 86 series 2). There is also slight swi
rling of the adjacent mesenteric vessels. Many of the dilated small bowel loops are clustered in the 
right side of the abdomen, and some of them are located anterior and right lateral to the colon and t
he liver. On the prior CT pelvis, small bowel loops were not lateral or anterior to the visible colon
. There is a small amount of mesenteric free fluid adjacent to these bowel loops. No free air.

 

The appendix is normal. Within the cecum, near or at the ileocecal valve, is a large 6.8 cm lesion of
 fat attenuation.

 

Vessels: There is mild atherosclerotic disease without aneurysm. No dissection.

 

Nodes: Normal.

 

Pelvis: There is a moderate size fat-containing left inguinal hernia. There are pelvic phleboliths Jareth
titi/vertebra/soft tissues: There is posterior fusion hardware from L3 through L5. Hardware is intact.
 There is bony fusion across the L2-3 disc space. There is moderate to severe multilevel degenerative
 change of the spine. There is 3 mm retrolisthesis of L5 compared to S1. There is 4 mm retrolisthesis
 of L1 compared to L2. There are numerous Schmorl nodes. Posterior decompression at L3 and L4. There 
is fluid within the posterior paraspinal muscles at these levels, and there is dependent subcutaneous
 edema, all likely related to prior surgery.

 

IMPRESSION:

1. Small bowel obstruction with transition in the right lower quadrant distal ilium. There is 270 deg
juaquin swirling of the distal ileum. While small bowel obstruction could be due to adhesion, given the 
swirling of the small bowel in the stomach clustered small bowel loops, possibility of internal herni
a is raised.

2. Small amount of reactive mesenteric free fluid secondary to small bowel obstruction.

3. Moderate-sized fat-containing left inguinal hernia.

4. 6.8 cm lesion of fat attenuation at/near the ileocecal valve may be a lipoma. Extensive fatty hype
rtrophy of the ileocecal valve is possible.

 

These findings were discussed by phone with KAT LIN on 2018 2:15 AM.

 

Report Dictated By: Tamar Alston at 2018 1:40 AM

 

Report E-Signed By: Tamar Alston  at 2018 2:21 AM

 

WSN:[id number]

## 2018-11-19 NOTE — GEN SURGERY HISTORY & PHYSICAL
History of Present Illness


Chief Complaint


Abdominal pain


History of Present Illness


78-year-old gentleman presents with several hours of diffuse abdominal pain and 


increased abdominal distention. This started about an hour before presentation 


to the emergency room. No nausea or vomiting. He is passing a little flatus and 


his last bowel movement was just before the symptoms started. No blood in his 


stools. He came into the emergency room where he was found to have a mild 


leukocytosis and a CT scan is consistent with a small bowel obstruction. This is


his 1st bowel obstruction. His only abdominal surgery is a [place] divided 


gastric bypass with concurrent cholecystectomy 14 years ago in . He has had 


multiple orthopedic surgeries including a lumbar spine fusion 3 months ago by 


Dr. Farr. He's also had bilateral total knee arthroplasties and a right femur 


rodding.





History


Problems:  


(1) H/O gastric bypass


Status:  Chronic


(2) Hx of cholecystectomy


Status:  Chronic


(3) History of bilateral knee replacement


Status:  Chronic


(4) Intrinsic asthma


Status:  Chronic


(5) MARIAN (obstructive sleep apnea)


Status:  Chronic


(6) Atrial fibrillation


Status:  Chronic


(7) HTN (hypertension)


Status:  Chronic


Home Meds


Active Scripts


Montelukast Sodium (SINGULAIR) 10 Mg Tablet, 1 TAB PO HS for 90 Days, #90 TAB 4 


Refills


   Prov:SPIKE ROSA MD         11/15/18


Diclofenac Sodium 1% Gel (VOLTAREN 1% GEL) 100 Gm Gel..gram., 2 GM TOP BID for 


30 Days, #1 TUBE


   Prov:SPIKE ROSA MD         10/2/18


Rivaroxaban 20 Mg (XARELTO 20 MG) 20 Mg Tablet, 1 TAB PO DAILY, #30 TAB 0 


Refills


   Prov:SOLEDAD HANNA MD         10/1/15


Reported Medications


Triamcinolone Acetonide (Nasacort) 10.8 Ml Spray, 2 SPRAYS NS DAILY PRN for 


ALLERGY SYMPTOMS


   18


Calcium Citrate (CALCIUM CITRATE) 250 Mg Tablet, 250 MG PO DAILY


   18


Cyanocobalamin (Vitamin B-12) (VITAMIN B-12) 1,000 Mcg Tab.subl, 1000 MCG SL 


DAILY


   18


Losartan Potassium (LOSARTAN POTASSIUM) 50 Mg Tablet, 1 TAB PO DAILY


   18


Melatonin (MELATONIN) 5 Mg Tablet, 10 MG PO HS


   18


Fluticasone/Salmeterol (ADVAIR -21 MCG INHALER) 12 Gm Hfa.aer.ad, 2 PUFF 


IH HS


   18


Cetirizine Hcl (CETIRIZINE HCL) 10 Mg Tablet, 10 MG PO QDAY, TAB


   18


Vit C/E/Zn/Coppr/Lutein/Zeaxan (Preservision Areds 2 Softgel) 1 Each Capsule, 1 


CAP BID


   18


Cholecalciferol (Vitamin D3) (VITAMIN D3) 2,000 Unit Capsule, 1 CAP PO DAILY, 


CAPSULE


   18


Vitamin E Acetate (VITAMIN E) 1,000 Unit Capsule, 1 CAP PO DAILY, CAPSULE


   18


Lutein (LUTEIN) 20 Mg Capsule, 1 CAP PO DAILY, CAPSULE


   18


Gluc 2KCL/Chondr/Caryl Hy/Hy Ac (GLUCOSAMINE & CHONDROITIN CAP) 1 Each Capsule, 1


CAP PO DAILY, CAPSULE


   18


Multivit-Min/FA/Lycopen/Lutein (Centrum Silver Men Tablet) 300 Mcg-600 Mcg-300 


Mcg Tablet, 1 TAB PO DAILY


   pt states this is chewable form


   18


Fluticasone Prop 50 Mcg Ns (FLONASE 50 MCG NS) 16 Gm Spray.susp, 2 SPRAYS NS 


PRN, BOT


   18


Albuterol Sulfate (VENTOLIN HFA) 18 Gm Inh, 1-2 PUFF INH QID PRN for 30 Days, 


INH


   14


Ascorbate Calcium (Vitamin C) 500 Mg Tablet, 1 TAB PO DAILY


   13


Allergies:  


Coded Allergies:  


     morphine (Unverified  Allergy, Intermediate, TURNS RED, 18)


   


   "felt like run over by truck"


Uncoded Allergies:  


     HAYFEVER (Allergy, Unknown, 14)


Patient History:  


Asthma


  FATHER, , Age:56


FH: COPD (chronic obstructive pulmonary disease)


  MOTHER, , Age:84


FH: MI (myocardial infarction)


  MOTHER, , Age:84


FH: macular degeneration


  MOTHER, , Age:84


FH: pancreatic cancer


  BROTHER, , Age:60 years and older





Review of Systems


All Systems Reviewed/Normal:  Yes, Except as Noted


Gastrointestinal:  Abdominal Pain





Exam


General Appearance:  Alert, Awake, No Acute Distress, Afebrile


Neuro:  No Gross deficits


Eyes:  PERRLA


GI:  Other (soft, diffuse tenderness to palpation, no peritoneal signs, 


distended and tympanic)


Extremities:  Warm, Perfused


Psych:  Alert & Oriented X3, Appropriate Mood & Affect





Medical Decision Making


Data Points


Result Diagram:  


18 23518 2355








Assessment and Plan


Problems:  


(1) SBO (small bowel obstruction)


Status:  Acute


Assessment & Plan:  18:  Admit, will start with conservative management 


with bowel rest, we'll keep him nothing by mouth with IV fluids. If he fails to 


improve over the next couple of days then he may require surgical exploration. 


May consider a water soluble small bowel follow-through if he fails to improve 


over the next 24 hours or so. I have explained bowel obstructions with this 


patient as well as their treatment and I have explained this plan to him. He 


seems to understand and seems agreeable with this plan.





Condition


Stable


Time Spent:  < 30 min





Venous Thromboembolism


VTE Risk


Physician Assess for VTE Risk:  Yes


Patient's VTE Risk:  Low





VTE Diagnostic Test


2 Days Prior to Admit:  No





Antithrombotics


Is Pt On Any Antithrombotics?:  No











ULLRICH,JOHN A MD              2018 02:35

## 2018-11-19 NOTE — GENERAL SURGERY PROGRESS NOTE
Subjective


Progress Notes


Subjective


No pain this morning.  Feeling a little better but no flatus or BM since before 


admission.





Physical Exam





Vital Signs








  Date Time  Temp Pulse Resp B/P (MAP) Pulse Ox O2 Delivery O2 Flow Rate FiO2


 


11/19/18 07:44 99.0 75 16 138/85 (102) 93 Nasal Cannula 1.5 














Intake and Output 


 


 11/19/18





 06:59


 


Intake Total 1000 ml


 


Balance 1000 ml


 


 


 


Intake IV Total 1000 ml








General Appearance:  Alert, Awake, No Acute Distress, Afebrile


GI:  Soft and Non-Tender (distended)


Extremities:  Warm, Perfused


Result Diagram:  


11/18/18 2355                                                                   


            11/18/18 2355








Assessment and Plan


Problems:  


(1) SBO (small bowel obstruction)


Status:  Acute


Assessment & Plan:  11/19/18:  Admit, will start with conservative management 


with bowel rest, we'll keep him nothing by mouth with IV fluids. If he fails to 


improve over the next couple of days then he may require surgical exploration. 


May consider a water soluble small bowel follow-through if he fails to improve 


over the next 24 hours or so. I have explained bowel obstructions with this 


patient as well as their treatment and I have explained this plan to him. He 


seems to understand and seems agreeable with this plan.





11/19/18:  Feels a little better but no flatus or BM although pain and exam seem


to be somewhat improved.  Continue bowel rest and IV fluids.  Will check KUB 


tomorrow morning.  If not resolved by tomorrow will get a SBFT with water-


soluble contrast.  Pt agreeable with this plan.





Condition


Stable.


Time Spent:  < 30 min





Exam


Sepsis Risk:  No Definite Risk











ULLRICH,JOHN A MD              Nov 19, 2018 08:14

## 2018-11-19 NOTE — RADIOLOGY IMAGING REPORT
FACILITY: [facility] - [place] 

 

PATIENT NAME: Randall Farr

: 1940

MR: [id number]

V: [id number]

EXAM DATE: 

ORDERING PHYSICIAN: JOHN ULLRICH

TECHNOLOGIST: 

 

Location: [facility]

Patient: Randall Farr

: 1940

MRN: [id number]

Visit/Account:[id number]

Date of Sevice: 2018

 

ACCESSION #: [id number]

 

CHEST SINGLE AP 2018 19:00 hours.

 

HISTORY: Small bowel obstruction. Enteric tube placement.

 

COMPARISON: 10/07/2017 and studies dating to 10/18/2009.

 

TECHNIQUE: Portable AP view of the chest.

 

FINDINGS:

 

Tubes/lines/hardware: NG or OG tube terminates in the body of the stomach. There are surgical clips t
he right upper quadrant from cholecystectomy.

 

Pulmonary: Right hemidiaphragm elevation is unchanged. There is mild bibasilar atelectasis, unchanged
. There is no pneumothorax or pleural effusion.

 

Cardiomediastinal: Cardiac and mediastinal silhouettes are within normal limits.

 

Bones/soft tissues: There is mild degenerative change of the spine. Partially visualized right should
er arthroplasty. Mild degenerative change of the left glenohumeral joint. There are dilated small bow
el loops in the abdomen.

 

IMPRESSION:

1. NG or OG tube terminates in the stomach.

2. Dilated small bowel loops, in keeping with small bowel obstruction.

3. Mild bibasilar atelectasis.

 

Report Dictated By: Tamar Alston at 2018 11:33 PM

 

Report E-Signed By: Tamar Alston  at 2018 11:35 PM

 

WSN:[id number]

## 2018-11-20 VITALS — SYSTOLIC BLOOD PRESSURE: 145 MMHG | DIASTOLIC BLOOD PRESSURE: 91 MMHG

## 2018-11-20 VITALS — DIASTOLIC BLOOD PRESSURE: 89 MMHG | SYSTOLIC BLOOD PRESSURE: 138 MMHG

## 2018-11-20 VITALS — DIASTOLIC BLOOD PRESSURE: 82 MMHG | SYSTOLIC BLOOD PRESSURE: 139 MMHG

## 2018-11-20 VITALS — DIASTOLIC BLOOD PRESSURE: 98 MMHG | SYSTOLIC BLOOD PRESSURE: 154 MMHG

## 2018-11-20 VITALS — SYSTOLIC BLOOD PRESSURE: 139 MMHG | DIASTOLIC BLOOD PRESSURE: 93 MMHG

## 2018-11-20 VITALS — SYSTOLIC BLOOD PRESSURE: 131 MMHG | DIASTOLIC BLOOD PRESSURE: 105 MMHG

## 2018-11-20 LAB — PLATELET COUNT, AUTOMATED: 241 K/UL (ref 150–450)

## 2018-11-20 RX ADMIN — THIAMINE HYDROCHLORIDE PRN MLS/HR: 100 INJECTION, SOLUTION INTRAMUSCULAR; INTRAVENOUS at 22:32

## 2018-11-20 RX ADMIN — THIAMINE HYDROCHLORIDE PRN MLS/HR: 100 INJECTION, SOLUTION INTRAMUSCULAR; INTRAVENOUS at 05:02

## 2018-11-20 RX ADMIN — LOSARTAN POTASSIUM SCH MG: 50 TABLET, FILM COATED ORAL at 09:02

## 2018-11-20 RX ADMIN — ENOXAPARIN SODIUM SCH MG: 100 INJECTION SUBCUTANEOUS at 09:02

## 2018-11-20 RX ADMIN — Medication SCH MG: at 22:32

## 2018-11-20 RX ADMIN — THIAMINE HYDROCHLORIDE PRN MLS/HR: 100 INJECTION, SOLUTION INTRAMUSCULAR; INTRAVENOUS at 13:48

## 2018-11-20 RX ADMIN — MONTELUKAST SODIUM SCH MG: 10 TABLET, FILM COATED ORAL at 22:33

## 2018-11-20 RX ADMIN — PANTOPRAZOLE SODIUM SCH MG: 40 INJECTION, POWDER, FOR SOLUTION INTRAVENOUS at 09:02

## 2018-11-20 NOTE — GENERAL SURGERY PROGRESS NOTE
Subjective


Progress Notes


Subjective


Feeling a little better this morning.  No abdominal pain.  Placed NG tube last 


evening, only about 150cc out since placement, nonbilious but he's had RYDGB 


surgery 14 yrs ago.





Physical Exam





Vital Signs








  Date Time  Temp Pulse Resp B/P (MAP) Pulse Ox O2 Delivery O2 Flow Rate FiO2


 


11/20/18 04:26     85   


 


11/20/18 03:57 98.9 70 14 139/93 (108)  Nasal Cannula 3.0 














Intake and Output 


 


 11/20/18





 07:00


 


Intake Total 2120 ml


 


Output Total 660 ml


 


Balance 1460 ml


 


 


 


Intake IV Total 2120 ml


 


Output Urine Total 510 ml


 


Gastric Drainage Total 150 ml


 


# Voids 1








General Appearance:  Alert, Awake, No Acute Distress, Afebrile


GI:  Soft and Non-Tender


Extremities:  Warm, Perfused


Result Diagram:  


11/18/18 2355                                                                   


            11/18/18 2351








Assessment and Plan


Problems:  


(1) SBO (small bowel obstruction)


Status:  Acute


Assessment & Plan:  11/19/18:  Admit, will start with conservative management 


with bowel rest, we'll keep him nothing by mouth with IV fluids. If he fails to 


improve over the next couple of days then he may require surgical exploration. 


May consider a water soluble small bowel follow-through if he fails to improve 


over the next 24 hours or so. I have explained bowel obstructions with this 


patient as well as their treatment and I have explained this plan to him. He 


seems to understand and seems agreeable with this plan.





11/19/18:  Feels a little better but no flatus or BM although pain and exam seem


to be somewhat improved.  Continue bowel rest and IV fluids.  Will check KUB 


tomorrow morning.  If not resolved by tomorrow will get a SBFT with water-


soluble contrast.  Pt agreeable with this plan.





11/20/18:  Feeling a little better this morning.  Still no flatus.  Will get a 


water-soluble SBFT today and see if he'll open up.  Continue bowel rest, IV 


fluids, NG tube decompression.





Condition


Stable.


Time Spent:  < 30 min





Exam


Sepsis Risk:  No Definite Risk











ULLRICH,JOHN A MD              Nov 20, 2018 05:42

## 2018-11-20 NOTE — RADIOLOGY IMAGING REPORT
FACILITY: [facility] [place] 

 

PATIENT NAME: Randall Farr

: 1940

MR: [id number]

V: [id number]

EXAM DATE: 

ORDERING PHYSICIAN: JOHN ULLRICH

TECHNOLOGIST: 

 

Location: [facility] - [place]

Patient: Randall Farr

: 1940

MRN: [id number]

Visit/Account:[id number]

Date of Sevice: 2018

 

ACCESSION #: [id number]

 

Exam type: SMALL BOWEL SERIES

 

History: SBO, H/O gastric bypass, water soluble contrast only

 

Comparison: KUB performed earlier today.

 

Findings:

 

The patient received a Gastrografin suspension through the NG tube.  The patient is status post gastr
ic bypass.  The NG tube passed through the gastric pouch into the jejunum.  The Gastrografin did pass
 throughout the small bowel and reached the hepatic flexure and one hour and 45 minutes.  There are m
ultiple loops of mild to moderately dilated small bowel seen throughout the abdomen measuring up to 4
.7 cm in diameter.  Only the proximal jejunal loops did not appear dilated..  No fluoroscopy was perf
ormed

 

IMPRESSION:

 

1.  There multiple loops of mild to moderately dilated small bowel throughout the abdomen with only t
he proximal jejunal loops appearing normal in diameter.  The Gastrografin did however reached the hep
atic flexure and one hour and 45 minutes therefore findings are not likely related to a small bowel o
bstruction

 

Report Dictated By: Paty Isaac MD at 2018 3:50 PM

 

Report E-Signed By: Paty Isaac MD  at 2018 3:54 PM

 

WSN:[id number]

## 2018-11-20 NOTE — RADIOLOGY IMAGING REPORT
FACILITY: [facility] 

 

PATIENT NAME: Randall Farr

: 1940

MR: [id number]

V: [id number]

EXAM DATE: 

ORDERING PHYSICIAN: JOHN ULLRICH

TECHNOLOGIST: 

 

Location: [facility]

Patient: Randall Farr

: 1940

MRN: [id number]

Visit/Account:[id number]

Date of Sevice: 2018

 

ACCESSION #: [id number]

 

KUB SINGLE VIEW ABDOMEN

 

HISTORY: Small bowel obstruction.

 

COMPARISON: CT abdomen and pelvis 2018.

 

FINDINGS: 2 supine views of the abdomen were obtained.

 

NG or OG tube terminates within the stomach. There is posterior lumbar fusion hardware from L4 throug
h S1.

 

Distribution of bowel gas is normal with bowel in all four quadrants as well as centrally. No free ai
r. There are persistently dilated small bowel loops, although fewer dilated bowel loops than were pre
sent on the CT examination.

 

There is moderate degenerative change of the spine. There is mild leftward curvature of the lumbar sp
ine.

 

IMPRESSION:

1. Persistently dilated small bowel loops, although less dilated bowel loops than were present on pre
vious CT, suggesting improving small bowel obstruction.

 

Report Dictated By: Tamar Alston at 2018 5:55 AM

 

Report E-Signed By: Tamar Alston  at 2018 5:57 AM

 

WSN:[id number]

## 2018-11-21 VITALS — SYSTOLIC BLOOD PRESSURE: 141 MMHG | DIASTOLIC BLOOD PRESSURE: 92 MMHG

## 2018-11-21 VITALS — DIASTOLIC BLOOD PRESSURE: 94 MMHG | SYSTOLIC BLOOD PRESSURE: 146 MMHG

## 2018-11-21 VITALS — DIASTOLIC BLOOD PRESSURE: 71 MMHG | SYSTOLIC BLOOD PRESSURE: 113 MMHG

## 2018-11-21 VITALS — DIASTOLIC BLOOD PRESSURE: 77 MMHG | SYSTOLIC BLOOD PRESSURE: 127 MMHG

## 2018-11-21 VITALS — SYSTOLIC BLOOD PRESSURE: 160 MMHG | DIASTOLIC BLOOD PRESSURE: 105 MMHG

## 2018-11-21 LAB — PLATELET COUNT, AUTOMATED: 204 K/UL (ref 150–450)

## 2018-11-21 RX ADMIN — DEXTROSE MONOHYDRATE, SODIUM CHLORIDE, AND POTASSIUM CHLORIDE SCH MLS/HR: 50; 4.5; 1.49 INJECTION, SOLUTION INTRAVENOUS at 07:32

## 2018-11-21 RX ADMIN — Medication SCH MG: at 22:31

## 2018-11-21 RX ADMIN — ENOXAPARIN SODIUM SCH MG: 100 INJECTION SUBCUTANEOUS at 09:31

## 2018-11-21 RX ADMIN — LOSARTAN POTASSIUM SCH MG: 50 TABLET, FILM COATED ORAL at 09:32

## 2018-11-21 RX ADMIN — PANTOPRAZOLE SODIUM SCH MG: 40 INJECTION, POWDER, FOR SOLUTION INTRAVENOUS at 09:31

## 2018-11-21 RX ADMIN — FLUTICASONE PROPIONATE PRN GM: 50 SPRAY, METERED NASAL at 20:25

## 2018-11-21 RX ADMIN — MONTELUKAST SODIUM SCH MG: 10 TABLET, FILM COATED ORAL at 22:31

## 2018-11-21 NOTE — MEDICAL NUTRITION THERAPY
Nutrition Anthropometrics


Height (Inches):  67.00


Height (Calculated Centimeters:  170.569816


Weight (Pounds):  233


Weight (Calculated Kilograms):  105.687


BMI:  36.5


Hunter Nutrition Score:         Adequate 


Hunter Nutrition Risk Score:  19


Dietary Referral


Nutrition Risk Factors:      


Nutrition Risk Comment:  Small bowel obstruction.





Physical Findings


Physical Appearance:  Obese BMI 30-39


Skin Appearance


Skin Appearance:


Edema


Edema Location Modifier:  


Edema Location:               


Type of Edema:                 


Degree of Edema:


Gastrointestinal Symptoms


GI Symtoms:                Diarrhea 


Tube Present:              NG 


Bowel Sounds:              


Recent Bowel Pattern:    


Stool Characteristics:





Nutritional Diagnosis


Nutritional Risk Acuity 1:  GI Obstruction


Past Medical History:  


Hx of Afib, HTN, COPD, pneumonia, Obstructive Sleep Apena, and intrinsic


asthma, gastric bypass.


Nutritional Acuity:  1-High


Nutrition Diagnosis:  Altered GI Function


Nutrition Etiology:  Physiological Causes


Nutrition Problem/Etiology/Sym:  


Altered GI function r/t physiological causes AEB obstruction, no flatus or


bowel movements.


Energy Requirement:  1590 ([employer] adj for obesity * 1.2)


Protein Requirement:  95 (.9g/kg)


Fluid Requirement:  2100 (20ml/kg)


Diet Type:  Clear Liquids


Nutrition Intervention:  Incr diet as tolerated





Nutrition Monitoring & Eval


Nutrition Goals:  Eat % Meal, Drink > 1500 cc/day


RD Patient Assessment Time:  30 minutes


RD Assessment Type:  RD Re-Assessment


Patient Nutrition Acuity:  1-High


Follow Up Date:  Nov 24, 2018


Nutritional Comment:  


11/19 Pt admitted with SBO. Pt is NPO/Meds only. Hx of A-fib, HTN, COPD,


pneumonia, Obstructive Sleep Apena, intrinsic asthma, and gastric bypass


(2004). Pt's pain is better this morning, but still has bloating, no


flatus or bowel movement since admission. Pt has elevated glucose at 162


and BUN at 23. Pt has 3+ edema, anticipate weight loss. Will follow. TB





11/21 Pt advanced to Clear liquid diet. No complaints this am, passing


flatus and stool. Decreasing levels of Hgb at 9.7, Hct at 31.6. Increasing


level of chloride at 114. Will monitor GI symptoms related to increase of


PO intake. TB











CASS DOLAN                    Nov 21, 2018 08:52

## 2018-11-21 NOTE — RADIOLOGY IMAGING REPORT
[place] [facility] [place] 

 

PATIENT NAME: Randall Farr

: 1940

MR: [id number]

V: [id number]

EXAM DATE: 

ORDERING PHYSICIAN: JOHN ULLRICH

TECHNOLOGIST: 

 

Location: [facility] [place]

Patient: Randall Farr

: 1940

MRN: [id number]

Visit/Account:[id number]

Date of Sevice: 2018

 

ACCESSION #: [id number]

 

KUB SINGLE VIEW ABDOMEN

 

HISTORY: Small bowel obstruction. Follow-up.

 

COMPARISON: 2018 and 2018.

 

FINDINGS: 2 supine views of the abdomen were obtained.

 

Distribution of bowel gas is normal with bowel in all four quadrants as well as centrally. There are 
fewer dilated small bowel loops No free air.

 

There is posterior lumbar fusion from L3 through L5. Hardware is intact. There is a mild to moderate 
leftward curvature of the lumbar spine. There is stable degenerative change of the spine, moderate to
 severe.

 

IMPRESSION:

1. Fewer dilated small bowel loops, compatible with resolving small bowel obstruction or ileus.

 

Report Dictated By: Tamar Alston at 2018 5:59 AM

 

Report E-Signed By: Tamar Alston  at 2018 6:01 AM

 

WSN:[id number]

## 2018-11-21 NOTE — GENERAL SURGERY PROGRESS NOTE
Subjective


Progress Notes


Subjective


No complaints this morning.  Passing flatus and stool.





Physical Exam





Vital Signs








  Date Time  Temp Pulse Resp B/P (MAP) Pulse Ox O2 Delivery O2 Flow Rate FiO2


 


11/21/18 06:28  48      


 


11/20/18 22:31 98.7  18 131/105 (114) 94 Nasal Cannula 2.0 














Intake and Output 


 


 11/21/18





 07:00


 


Intake Total 2492 ml


 


Output Total 1475 ml


 


Balance 1017 ml


 


 


 


Intake Oral 500 ml


 


IV Total 1992 ml


 


Output Urine Total 1475 ml


 


# Voids 6


 


# Bowel Movements 2








General Appearance:  Alert, Awake, No Acute Distress, Afebrile


GI:  Soft and Non-Tender


Extremities:  Warm, Perfused


Result Diagram:  


11/21/18 0537                                                                   


            11/21/18 0537








Assessment and Plan


Problems:  


(1) SBO (small bowel obstruction)


Status:  Acute


Assessment & Plan:  11/19/18:  Admit, will start with conservative management 


with bowel rest, we'll keep him nothing by mouth with IV fluids. If he fails to 


improve over the next couple of days then he may require surgical exploration. 


May consider a water soluble small bowel follow-through if he fails to improve 


over the next 24 hours or so. I have explained bowel obstructions with this pat


ient as well as their treatment and I have explained this plan to him. He seems 


to understand and seems agreeable with this plan.





11/19/18:  Feels a little better but no flatus or BM although pain and exam seem


to be somewhat improved.  Continue bowel rest and IV fluids.  Will check KUB 


tomorrow morning.  If not resolved by tomorrow will get a SBFT with water-


soluble contrast.  Pt agreeable with this plan.





11/20/18:  Feeling a little better this morning.  Still no flatus.  Will get a 


water-soluble SBFT today and see if he'll open up.  Continue bowel rest, IV 


fluids, NG tube decompression.





11/21/18:  Doing well.  SBO seems to be resolving.  SBFT yesterday revealed 


contrast making it to colon.  KUB this morning with mild but improving small 


bowel dilation.  Clear diet today.  O/W CCM.





Condition


Stable.


Time Spent:  < 30 min





Exam


Sepsis Risk:  No Definite Risk











ULLRICH,JOHN A MD              Nov 21, 2018 06:46

## 2018-11-22 VITALS — SYSTOLIC BLOOD PRESSURE: 153 MMHG | DIASTOLIC BLOOD PRESSURE: 85 MMHG

## 2018-11-22 VITALS — SYSTOLIC BLOOD PRESSURE: 154 MMHG | DIASTOLIC BLOOD PRESSURE: 92 MMHG

## 2018-11-22 VITALS — SYSTOLIC BLOOD PRESSURE: 134 MMHG | DIASTOLIC BLOOD PRESSURE: 69 MMHG

## 2018-11-22 RX ADMIN — DEXTROSE MONOHYDRATE, SODIUM CHLORIDE, AND POTASSIUM CHLORIDE SCH MLS/HR: 50; 4.5; 1.49 INJECTION, SOLUTION INTRAVENOUS at 05:38

## 2018-11-22 RX ADMIN — LOSARTAN POTASSIUM SCH MG: 50 TABLET, FILM COATED ORAL at 09:38

## 2018-11-22 RX ADMIN — ENOXAPARIN SODIUM SCH MG: 100 INJECTION SUBCUTANEOUS at 09:38

## 2018-11-22 RX ADMIN — FLUTICASONE PROPIONATE PRN GM: 50 SPRAY, METERED NASAL at 09:38

## 2018-11-22 NOTE — GENERAL SURGERY PROGRESS NOTE
Subjective


Progress Notes


Subjective


No complaints this morning.  No abdominal pain.  Tolerating clear diet.  Passing


flatus and BMs.





Physical Exam





Vital Signs








  Date Time  Temp Pulse Resp B/P (MAP) Pulse Ox O2 Delivery O2 Flow Rate FiO2


 


11/22/18 08:09 97.8 58 15 153/85 (107) 90 Nasal Cannula 2.0 














Intake and Output 


 


 11/22/18





 06:59


 


Intake Total 2389 ml


 


Output Total 300 ml


 


Balance 2089 ml


 


 


 


Intake Oral 1220 ml


 


IV Total 1169 ml


 


Output Urine Total 300 ml


 


# Voids 1








General Appearance:  Alert, Awake, No Acute Distress, Afebrile


GI:  Soft and Non-Tender


Extremities:  Warm, Perfused


Result Diagram:  


11/21/18 0537                                                                   


            11/21/18 0537








Assessment and Plan


Problems:  


(1) SBO (small bowel obstruction)


Status:  Acute


Assessment & Plan:  11/19/18:  Admit, will start with conservative management 


with bowel rest, we'll keep him nothing by mouth with IV fluids. If he fails to 


improve over the next couple of days then he may require surgical exploration. 


May consider a water soluble small bowel follow-through if he fails to improve 


over the next 24 hours or so. I have explained bowel obstructions with this 


patient as well as their treatment and I have explained this plan to him. He 


seems to understand and seems agreeable with this plan.





11/19/18:  Feels a little better but no flatus or BM although pain and exam seem


to be somewhat improved.  Continue bowel rest and IV fluids.  Will check KUB 


tomorrow morning.  If not resolved by tomorrow will get a SBFT with water-


soluble contrast.  Pt agreeable with this plan.





11/20/18:  Feeling a little better this morning.  Still no flatus.  Will get a 


water-soluble SBFT today and see if he'll open up.  Continue bowel rest, IV 


fluids, NG tube decompression.





11/21/18:  Doing well.  SBO seems to be resolving.  SBFT yesterday revealed 


contrast making it to colon.  KUB this morning with mild but improving small 


bowel dilation.  Clear diet today.  O/W CCM.





11/22/18:  Doing well.  SBO seems to be resolved.  Will try regular diet today. 


If he tolerates this then will plan on home later today or tomorrow.





Condition


STable.


Time Spent:  < 30 min





Exam


Sepsis Risk:  No Definite Risk











ULLRICH,JOHN A MD              Nov 22, 2018 08:39

## 2018-11-22 NOTE — SHORT(OUTPT) DISCHARGE SUMMARY
Discharge Summary


Reason for Hosp/Final Diag:  


(1) SBO (small bowel obstruction)


Status:  Acute


Hospital Course & Plan:  11/19/18:  Admit, will start with conservative 


management with bowel rest, we'll keep him nothing by mouth with IV fluids. If 


he fails to improve over the next couple of days then he may require surgical 


exploration. May consider a water soluble small bowel follow-through if he fails


to improve over the next 24 hours or so. I have explained bowel obstructions 


with this patient as well as their treatment and I have explained this plan to 


him. He seems to understand and seems agreeable with this plan.





11/19/18:  Feels a little better but no flatus or BM although pain and exam seem


to be somewhat improved.  Continue bowel rest and IV fluids.  Will check KUB 


tomorrow morning.  If not resolved by tomorrow will get a SBFT with water-


soluble contrast.  Pt agreeable with this plan.





11/20/18:  Feeling a little better this morning.  Still no flatus.  Will get a 


water-soluble SBFT today and see if he'll open up.  Continue bowel rest, IV 


fluids, NG tube decompression.





11/21/18:  Doing well.  SBO seems to be resolving.  SBFT yesterday revealed 


contrast making it to colon.  KUB this morning with mild but improving small 


bowel dilation.  Clear diet today.  O/W CCM.





11/22/18:  Doing well.  SBO seems to be resolved.  Will try regular diet today. 


If he tolerates this then will plan on home later today or tomorrow.





11/22/18 (evening):  Doing well.  Tolerating regular diet.  Passing flatus and 2


BMs today.  He wishes to go home.  Will d/c to home.





Departure


Discharge to:  Home, Self Care





Discharge Instructions


Home Meds


Active Scripts


Montelukast Sodium (SINGULAIR) 10 Mg Tablet, 1 TAB PO HS for 90 Days, #90 TAB 4 


Refills


   Prov:SPIKE ROSA MD         11/15/18


Rivaroxaban 20 Mg (XARELTO 20 MG) 20 Mg Tablet, 1 TAB PO DAILY, #30 TAB 0 


Refills


   Prov:SOLEDAD HANNA MD         10/1/15


Reported Medications


Diclofenac Sodium 1% Gel (VOLTAREN 1% GEL) 100 Gm Gel..gram., 2 GM TOP BID for 


30 Days, #1 TUBE


   11/19/18


Triamcinolone Acetonide (Nasacort) 10.8 Ml Spray, 2 SPRAYS NS DAILY PRN for 


ALLERGY SYMPTOMS


   9/12/18


Calcium Citrate (CALCIUM CITRATE) 250 Mg Tablet, 250 MG PO DAILY


   9/12/18


Cyanocobalamin (Vitamin B-12) (VITAMIN B-12) 1,000 Mcg Tab.subl, 1000 MCG SL 


DAILY


   9/12/18


Losartan Potassium (LOSARTAN POTASSIUM) 50 Mg Tablet, 1 TAB PO DAILY for 90 


Days, #90


   8/29/18


Melatonin (MELATONIN) 5 Mg Tablet, 10 MG PO HS


   8/29/18


Fluticasone/Salmeterol (ADVAIR -21 MCG INHALER) 12 Gm Hfa.aer.ad, 2 PUFF 


IH HS


   8/23/18


Cetirizine Hcl (CETIRIZINE HCL) 10 Mg Tablet, 10 MG PO QDAY, TAB


   6/13/18


Vit C/E/Zn/Coppr/Lutein/Zeaxan (Preservision Areds 2 Softgel) 1 Each Capsule, 1 


CAP BID


   6/13/18


Cholecalciferol (Vitamin D3) (VITAMIN D3) 2,000 Unit Capsule, 1 CAP PO DAILY, 


CAPSULE


   2/12/18


Vitamin E Acetate (VITAMIN E) 1,000 Unit Capsule, 1 CAP PO DAILY, CAPSULE


   2/5/18


Lutein (LUTEIN) 20 Mg Capsule, 1 CAP PO DAILY, CAPSULE


   2/5/18


Gluc 2KCL/Chondr/Caryl Hy/Hy Ac (GLUCOSAMINE & CHONDROITIN CAP) 1 Each Capsule, 1


CAP PO DAILY, CAPSULE


   2/5/18


Multivit-Min/FA/Lycopen/Lutein (Centrum Silver Men Tablet) 300 Mcg-600 Mcg-300 


Mcg Tablet, 1 TAB PO DAILY


   pt states this is chewable form


   2/5/18


Fluticasone Prop 50 Mcg Ns (FLONASE 50 MCG NS) 16 Gm Spray.susp, 2 SPRAYS NS 


PRN, BOT


   2/5/18


Albuterol Sulfate (VENTOLIN HFA) 18 Gm Inh, 1-2 PUFF INH QID PRN for 30 Days, 


INH


   8/7/14


Ascorbate Calcium (Vitamin C) 500 Mg Tablet, 1 TAB PO DAILY


   2/18/13


Discontinued Scripts


Diclofenac Sodium 1% Gel (VOLTAREN 1% GEL) 100 Gm Gel..gram., 2 GM TOP BID for 


30 Days, #1 TUBE


   Prov:SPIKE ROSA MD         10/2/18


Follow up Referrals:  


Internal Medicine - In One Week @ [facility] with SPIKE ROSA MD





Diet:  Regular


Activity:  As Tolerated


Special Instructions:  


Return to the ER if you develop worsening abdominal pain, bloating,


nausea, vomiting, you stop passing flatus or bowel movements.


Copies to:   SPIKE ROSA MD ;











ULLRICH,JOHN A MD              Nov 22, 2018 17:17

## 2019-04-04 ENCOUNTER — HOSPITAL ENCOUNTER (OUTPATIENT)
Dept: HOSPITAL 89 - LAB | Age: 79
End: 2019-04-04
Attending: FAMILY MEDICINE
Payer: MEDICARE

## 2019-04-04 VITALS — BODY MASS INDEX: 35.08 KG/M2

## 2019-04-04 DIAGNOSIS — I10: ICD-10-CM

## 2019-04-04 DIAGNOSIS — E55.9: ICD-10-CM

## 2019-04-04 DIAGNOSIS — M10.9: Primary | ICD-10-CM

## 2019-04-04 LAB — PLATELET COUNT, AUTOMATED: 271 K/UL (ref 150–450)

## 2019-04-04 PROCEDURE — 84450 TRANSFERASE (AST) (SGOT): CPT

## 2019-04-04 PROCEDURE — 36415 COLL VENOUS BLD VENIPUNCTURE: CPT

## 2019-04-04 PROCEDURE — 82435 ASSAY OF BLOOD CHLORIDE: CPT

## 2019-04-04 PROCEDURE — 82310 ASSAY OF CALCIUM: CPT

## 2019-04-04 PROCEDURE — 82947 ASSAY GLUCOSE BLOOD QUANT: CPT

## 2019-04-04 PROCEDURE — 82247 BILIRUBIN TOTAL: CPT

## 2019-04-04 PROCEDURE — 82565 ASSAY OF CREATININE: CPT

## 2019-04-04 PROCEDURE — 84520 ASSAY OF UREA NITROGEN: CPT

## 2019-04-04 PROCEDURE — 84132 ASSAY OF SERUM POTASSIUM: CPT

## 2019-04-04 PROCEDURE — 84460 ALANINE AMINO (ALT) (SGPT): CPT

## 2019-04-04 PROCEDURE — 82040 ASSAY OF SERUM ALBUMIN: CPT

## 2019-04-04 PROCEDURE — 85025 COMPLETE CBC W/AUTO DIFF WBC: CPT

## 2019-04-04 PROCEDURE — 82306 VITAMIN D 25 HYDROXY: CPT

## 2019-04-04 PROCEDURE — 84295 ASSAY OF SERUM SODIUM: CPT

## 2019-04-04 PROCEDURE — 82374 ASSAY BLOOD CARBON DIOXIDE: CPT

## 2019-04-04 PROCEDURE — 84075 ASSAY ALKALINE PHOSPHATASE: CPT

## 2019-04-04 PROCEDURE — 84550 ASSAY OF BLOOD/URIC ACID: CPT

## 2019-04-04 PROCEDURE — 84155 ASSAY OF PROTEIN SERUM: CPT

## 2019-08-07 ENCOUNTER — HOSPITAL ENCOUNTER (OUTPATIENT)
Dept: HOSPITAL 89 - OR | Age: 79
Discharge: HOME | End: 2019-08-07
Attending: SURGERY
Payer: MEDICARE

## 2019-08-07 VITALS — SYSTOLIC BLOOD PRESSURE: 119 MMHG | DIASTOLIC BLOOD PRESSURE: 83 MMHG

## 2019-08-07 VITALS — SYSTOLIC BLOOD PRESSURE: 129 MMHG | DIASTOLIC BLOOD PRESSURE: 99 MMHG

## 2019-08-07 VITALS
WEIGHT: 215 LBS | HEIGHT: 67 IN | BODY MASS INDEX: 33.74 KG/M2 | BODY MASS INDEX: 33.74 KG/M2 | WEIGHT: 215 LBS | HEIGHT: 67 IN

## 2019-08-07 VITALS — DIASTOLIC BLOOD PRESSURE: 94 MMHG | SYSTOLIC BLOOD PRESSURE: 127 MMHG

## 2019-08-07 VITALS — DIASTOLIC BLOOD PRESSURE: 112 MMHG | SYSTOLIC BLOOD PRESSURE: 156 MMHG

## 2019-08-07 VITALS — DIASTOLIC BLOOD PRESSURE: 71 MMHG | SYSTOLIC BLOOD PRESSURE: 130 MMHG

## 2019-08-07 DIAGNOSIS — D12.5: ICD-10-CM

## 2019-08-07 DIAGNOSIS — Z12.11: Primary | ICD-10-CM

## 2019-08-07 DIAGNOSIS — D12.4: ICD-10-CM

## 2019-08-07 DIAGNOSIS — K63.9: ICD-10-CM

## 2019-08-07 PROCEDURE — 88305 TISSUE EXAM BY PATHOLOGIST: CPT

## 2019-08-07 PROCEDURE — 45385 COLONOSCOPY W/LESION REMOVAL: CPT

## 2019-08-07 PROCEDURE — 00811 ANES LWR INTST NDSC NOS: CPT

## 2019-08-07 NOTE — NUR
1253 PATIENT BEGAN DOING ORTHOSTATICS. HE DENIES ANY DIZZINESS OR LIGHTHEADEDNESS 

1254 PATIENT BEGAN STANDING. HE STATED HE BEGAN TO FEEL LIGHTHEADED. HE SAT DOWN FOR A TIME 
TO LET IT PASS 

1255 PATIENT BEGAN DRINKING WATER

## 2019-08-07 NOTE — NUR
1304 PATIENT WAS ABLE TO STAND AND DENIES ANY LIGHTHEADEDNESS. HE WAS ABLE TO WALK HIMSELF 
TO THE BATHROOM TO VOID

## 2019-08-07 NOTE — NUR
1223 DR. ULLRICH TALKED WITH PATIENT AND HIS SPOUSE 

1225 PATIENTS O2 WAS MOVED TO 2 LITERS OXYMASK. HE DENIES ANY PAIN OR NAUSEA.

## 2019-08-07 NOTE — NUR
8913 PATIENT WAS DC'D VIA WHEELCHAIR. HE DENIES ANY PAIN OR NAUSEA. HE STATES HE NO LONGER 
FEELS LIGHTHEADED. SEE DISCHARGE ASSESSMENT.

## 2019-08-07 NOTE — SHORT(OUTPT) DISCHARGE SUMMARY
Discharge Summary


Reason for Hosp/Final Diag:  


(1) Colon cancer screening


Status:  Chronic


Hospital Course & Plan:  Colonoscopy with polypectomy x4 and snare biopsy of 


cecal mass completed without problems.





(2) Mass of cecum


Status:  Chronic


Departure


Discharge to:  Home, Self Care





Discharge Instructions


Home Meds


Active Scripts


Cholecalciferol (Vitamin D3) (VITAMIN D3) 50,000 Unit Capsule, 1 CAP PO QWEEK, 


#8 CAPSULE


   Take 1 capsule every week and then recheck bloodwork at [facility] on


   9/30/19


   Prov:SPIKE ROSA MD         8/5/19


Rivaroxaban 20 Mg (XARELTO 20 MG) 20 Mg Tablet, 1 TAB PO DAILY for 90 Days, #90 


TAB 2 Refills


   Prov:SPIKE ROSA MD         6/17/19


Ferrous Sulfate (IRON) 325 Mg Tablet, 1 TAB PO DAILY for 90 Days, #90 TAB


   Prov:SPIKE ROSA MD         4/9/19


Fluticasone/Salmeterol (ADVAIR -21 MCG INHALER) 12 Gm Hfa.aer.ad, 2 PUFF 


IH HS, #1 BOTTLE 1 Refill


   Prov:PACO CONTRERAS FNP-C         3/20/19


Montelukast Sodium (SINGULAIR) 10 Mg Tablet, 1 TAB PO HS for 90 Days, #90 TAB 4 


Refills


   Prov:SPIKE ROSA MD         11/15/18


Reported Medications


Diclofenac Sodium 1% Gel (VOLTAREN 1% GEL) 100 Gm Gel..gram., 2 GM TOP BID for 


30 Days, #1 TUBE


   11/19/18


Triamcinolone Acetonide (Nasacort) 10.8 Ml Spray, 2 SPRAYS NS DAILY PRN for 


ALLERGY SYMPTOMS


   9/12/18


Calcium Citrate (CALCIUM CITRATE) 250 Mg Tablet, 250 MG PO DAILY


   9/12/18


Cyanocobalamin (Vitamin B-12) (VITAMIN B-12) 1,000 Mcg Tab.subl, 1000 MCG SL 


DAILY


   9/12/18


Losartan Potassium (LOSARTAN POTASSIUM) 50 Mg Tablet, 1 TAB PO DAILY for 90 


Days, #90


   8/29/18


Melatonin (MELATONIN) 5 Mg Tablet, 10 MG PO HS


   8/29/18


Vit C/E/Zn/Coppr/Lutein/Zeaxan (Preservision Areds 2 Softgel) 1 Each Capsule, 1 


CAP BID


   6/13/18


Vitamin E Acetate (VITAMIN E) 1,000 Unit Capsule, 1 CAP PO DAILY, CAPSULE


   2/5/18


Lutein (LUTEIN) 20 Mg Capsule, 1 CAP PO DAILY, CAPSULE


   2/5/18


Gluc 2KCL/Chondr/Caryl Hy/Hy Ac (GLUCOSAMINE & CHONDROITIN CAP) 1 Each Capsule, 1


CAP PO DAILY, CAPSULE


   2/5/18


Multivit-Min/FA/Lycopen/Lutein (Centrum Silver Men Tablet) 300 Mcg-600 Mcg-300 


Mcg Tablet, 1 TAB PO DAILY


   pt states this is chewable form


   2/5/18


Fluticasone Prop 50 Mcg Ns (FLONASE 50 MCG NS) 16 Gm Spray.susp, 2 SPRAYS NS 


PRN, BOT


   2/5/18


Albuterol Sulfate (VENTOLIN HFA) 18 Gm Inh, 1-2 PUFF INH QID PRN for 30 Days, 


INH


   8/7/14


Ascorbate Calcium (Vitamin C) 500 Mg Tablet, 1 TAB PO DAILY


   2/18/13


Discontinued Reported Medications


Cholecalciferol (Vitamin D3) (VITAMIN D3) 2,000 Unit Capsule, 2 CAP PO DAILY


   2/12/18


Cetirizine Hcl (CETIRIZINE HCL) 10 Mg Tablet, 10 MG PO QDAY, TAB


   6/13/18


Diet:  Regular


Activity:  As Tolerated


Special Instructions:  


Your colonoscopy was completed without problems and your prep was


excellent (Good Job!!).  I saw a LARGE polyp in your colon that cannot be


removed endoscopically due to it's size and then I removed 4 smaller


polyps.  My office will call you in the next couple of days to schedule


you for some labs, a CT scan to evaluate the mass, and they will schedule


you with a follow up appointment to discuss all of these results and


determine the next course of action with you.











ULLRICH,JOHN A MD               Aug 7, 2019 12:22

## 2019-08-07 NOTE — NUR
1231 PATIENT WAS MOVED TO ROOM AIR AND APPEARS TO BE TOLERATING THIS WELL 

1232 PATIENT BEGAN EATING JELLO AND PUDDING AND DRINKING WATER

## 2019-08-09 ENCOUNTER — HOSPITAL ENCOUNTER (OUTPATIENT)
Dept: HOSPITAL 89 - CT | Age: 79
End: 2019-08-09
Attending: SURGERY
Payer: MEDICARE

## 2019-08-09 VITALS — BODY MASS INDEX: 35.08 KG/M2

## 2019-08-09 DIAGNOSIS — D17.5: Primary | ICD-10-CM

## 2019-08-09 PROCEDURE — 74177 CT ABD & PELVIS W/CONTRAST: CPT

## 2019-08-09 PROCEDURE — 71260 CT THORAX DX C+: CPT

## 2019-08-09 NOTE — RADIOLOGY IMAGING REPORT
FACILITY: [facility] [place] 

 

PATIENT NAME: Randall Farr

: 1940

MR: [id number]

V: [id number]

EXAM DATE: 

ORDERING PHYSICIAN: JOHN ULLRICH

TECHNOLOGIST: 

 

Location: [facility] [place]

Patient: Randall Farr

: 1940

MRN: [id number]

Visit/Account:[id number]

Date of Sevice:  2019

 

ACCESSION #: [id number]

 

CT CHEST ABDOMEN PELVIS W/CON

 

HISTORY:  Cecal mass.

 

TECHNIQUE:  CT chest, abdomen and pelvis with intravenous contrast.

 

One of the following dose optimization techniques was utilized in the performance of this exam: Autom
ated exposure control; adjustment of the mA and/or kV according to the patient's size; or use of an i
terative  reconstruction technique.  Specific details can be referenced in the facility's radiology C
T exam operational policy.

 

CONTRAST:  75 mL Isovue-370.

 

COMPARISON:  CT abdomen/pelvis dated 2018.

 

FINDINGS:

 

CHEST:

 

Heart/vessels:  Prominence of the main pulmonary artery measuring up to 3.7 cm which is nonspecific h
owever can be seen with pulmonary arterial hypertension.  Minimal atherosclerosis within the thoracic
 aorta.  Minimal calcification within the coronary arteries.  Mild left atrial enlargement.

 

Mediastinum:  Negative.

 

Lymph nodes:  Numerous nonspecific subcentimeter lymph nodes, some of which are calcified.

 

Lungs/pleura:  Chronic elevation of the right kidney diaphragm with associated passive atelectasis.  
Punctate calcified granuloma within the left upper lobe.

 

Bones/soft tissues:  No acute or concerning osseous abnormality.  Multiple chronic healed anterior ri
b fractures.  Unremarkable appearance of partially visualized right shoulder arthroplasty.

 

ABDOMEN/PELVIS:

Hepatobiliary:  Numerous cysts and subcentimeter hypodensities throughout the liver which are too sma
ll to characterize however statistically represents cysts.  Gallbladder surgically absent.  Otherwise
 negative.

 

Spleen:  Calcified granulomas.  Subcentimeter hypodensity within the spleen which is nonspecific minor
lauro unchanged and favored benign.

 

Adrenals:  Negative.

 

Pancreas:  Negative.

 

Kidneys/:  Mild prostate hypertrophy.  Otherwise negative.

 

GI:  Postsurgical changes within the stomach without visualized complication.  There is a benign-appe
aring lipoma within the distal ascending colon measuring 4.3 x 4.0 x 9.3 cm (coronal image 45 and axi
al image 71), grossly unchanged.  No visualized cecal mass or significant wall thickening.  Mild nons
pecific wall thickening of the cecum and proximal ascending colon.  No adjacent nodularity or concern
ing lymphadenopathy.

 

No dilated loops of small bowel to suggest obstruction.

 

Vessels/spaces/nodes:  Mild atherosclerosis.  Nonspecific borderline prominent right iliac chain lymp
h nodes which are increased in size since prior exam and asymmetric.  For example, a right external i
liac/obturator node measures 28 x 12 mm (image 178), previously 20 x 4 mm.  A right common iliac lymp
h node measures approximately 7 x 11 mm (image 151), previously measuring up to 4 mm.

 

Bones/soft tissues:  Grossly unremarkable appearance of lumbar fusion hardware.  Multilevel mild/mode
rate degenerative disc disease.  No acute or concerning osseous abnormality.  Small/moderate-sized fa
t-containing left inguinal hernia.

 

IMPRESSION:

1.  No visualized cecal mass.  There is mild nonspecific wall thickening of the cecum and proximal as
cending colon, possibly related to underdistention.

2.  Nonspecific borderline prominent right iliac chain lymph nodes which have increased in size since
 prior exam.  Findings could be reactive to an unknown infectious/inflammatory process.  Zara metast
asis cannot be excluded.  Consider repeat CT in 3 months.

3.  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

4.  Additional incidental/chronic findings, as above.

 

Report Dictated By: Toney Mosley MD at 2019 3:39 PM

 

Report E-Signed By: Toney Mosley MD  at 2019 4:40 PM

 

WSN:[id number]